# Patient Record
Sex: FEMALE | Race: BLACK OR AFRICAN AMERICAN | Employment: FULL TIME | URBAN - METROPOLITAN AREA
[De-identification: names, ages, dates, MRNs, and addresses within clinical notes are randomized per-mention and may not be internally consistent; named-entity substitution may affect disease eponyms.]

---

## 2017-01-03 ENCOUNTER — HOSPITAL ENCOUNTER (OUTPATIENT)
Dept: WOUND CARE | Age: 57
Discharge: HOME | End: 2017-01-03
Payer: COMMERCIAL

## 2017-01-03 PROCEDURE — 29580 STRAPPING UNNA BOOT: CPT

## 2017-01-11 PROCEDURE — 11042 DBRDMT SUBQ TIS 1ST 20SQCM/<: CPT

## 2017-01-17 PROCEDURE — 29580 STRAPPING UNNA BOOT: CPT

## 2017-01-23 PROCEDURE — 29580 STRAPPING UNNA BOOT: CPT

## 2017-02-01 ENCOUNTER — HOSPITAL ENCOUNTER (OUTPATIENT)
Dept: WOUND CARE | Age: 57
Discharge: HOME | End: 2017-02-01
Payer: COMMERCIAL

## 2017-02-01 PROCEDURE — 11042 DBRDMT SUBQ TIS 1ST 20SQCM/<: CPT

## 2017-02-10 PROCEDURE — 97602 WOUND(S) CARE NON-SELECTIVE: CPT

## 2017-02-17 PROCEDURE — 29580 STRAPPING UNNA BOOT: CPT

## 2017-02-27 PROCEDURE — 97602 WOUND(S) CARE NON-SELECTIVE: CPT

## 2017-03-07 ENCOUNTER — HOSPITAL ENCOUNTER (OUTPATIENT)
Dept: WOUND CARE | Age: 57
Discharge: HOME | End: 2017-03-07
Payer: COMMERCIAL

## 2017-03-07 PROCEDURE — 29580 STRAPPING UNNA BOOT: CPT

## 2017-03-15 PROCEDURE — 11042 DBRDMT SUBQ TIS 1ST 20SQCM/<: CPT

## 2017-03-20 PROCEDURE — 29580 STRAPPING UNNA BOOT: CPT

## 2017-04-03 ENCOUNTER — HOSPITAL ENCOUNTER (OUTPATIENT)
Dept: WOUND CARE | Age: 57
Discharge: HOME | End: 2017-04-03
Payer: COMMERCIAL

## 2017-04-03 PROCEDURE — 29580 STRAPPING UNNA BOOT: CPT

## 2017-04-12 PROCEDURE — 15271 SKIN SUB GRAFT TRNK/ARM/LEG: CPT

## 2017-04-17 PROCEDURE — 29580 STRAPPING UNNA BOOT: CPT

## 2017-04-24 PROCEDURE — 29580 STRAPPING UNNA BOOT: CPT

## 2017-05-01 ENCOUNTER — HOSPITAL ENCOUNTER (OUTPATIENT)
Dept: WOUND CARE | Age: 57
Discharge: HOME | End: 2017-05-01
Payer: COMMERCIAL

## 2017-05-01 PROCEDURE — 29580 STRAPPING UNNA BOOT: CPT

## 2017-05-08 PROCEDURE — 29580 STRAPPING UNNA BOOT: CPT

## 2017-05-17 PROCEDURE — 99211 OFF/OP EST MAY X REQ PHY/QHP: CPT

## 2019-02-19 ENCOUNTER — HOSPITAL ENCOUNTER (OUTPATIENT)
Dept: PREADMISSION TESTING | Age: 59
Discharge: HOME | End: 2019-02-19
Payer: COMMERCIAL

## 2019-02-19 VITALS — BODY MASS INDEX: 51.03 KG/M2 | WEIGHT: 288 LBS | HEIGHT: 63 IN

## 2019-02-19 LAB
ANION GAP SERPL CALC-SCNC: 7 MMOL/L (ref 3–18)
ATRIAL RATE: 68 BPM
BUN SERPL-MCNC: 17 MG/DL (ref 7–18)
BUN/CREAT SERPL: 17 (ref 12–20)
CALCIUM SERPL-MCNC: 9 MG/DL (ref 8.5–10.1)
CALCULATED P AXIS, ECG09: 66 DEGREES
CALCULATED R AXIS, ECG10: 12 DEGREES
CALCULATED T AXIS, ECG11: 64 DEGREES
CHLORIDE SERPL-SCNC: 103 MMOL/L (ref 100–108)
CO2 SERPL-SCNC: 30 MMOL/L (ref 21–32)
CREAT SERPL-MCNC: 1 MG/DL (ref 0.6–1.3)
DIAGNOSIS, 93000: NORMAL
GLUCOSE SERPL-MCNC: 96 MG/DL (ref 74–99)
HCT VFR BLD AUTO: 41.1 % (ref 35–45)
HGB BLD-MCNC: 12.6 G/DL (ref 12–16)
P-R INTERVAL, ECG05: 158 MS
POTASSIUM SERPL-SCNC: 4.9 MMOL/L (ref 3.5–5.5)
Q-T INTERVAL, ECG07: 422 MS
QRS DURATION, ECG06: 94 MS
QTC CALCULATION (BEZET), ECG08: 448 MS
SODIUM SERPL-SCNC: 140 MMOL/L (ref 136–145)
VENTRICULAR RATE, ECG03: 68 BPM

## 2019-02-19 PROCEDURE — 85018 HEMOGLOBIN: CPT

## 2019-02-19 PROCEDURE — 80048 BASIC METABOLIC PNL TOTAL CA: CPT

## 2019-02-19 PROCEDURE — 93005 ELECTROCARDIOGRAM TRACING: CPT

## 2019-02-19 RX ORDER — IBUPROFEN 800 MG/1
800 TABLET ORAL
COMMUNITY
End: 2022-10-18 | Stop reason: ALTCHOICE

## 2019-02-19 RX ORDER — FLUTICASONE PROPIONATE AND SALMETEROL 250; 50 UG/1; UG/1
1 POWDER RESPIRATORY (INHALATION) EVERY 12 HOURS
COMMUNITY

## 2019-02-19 RX ORDER — LORAZEPAM 0.5 MG/1
0.5 TABLET ORAL
COMMUNITY

## 2019-02-19 RX ORDER — SPIRONOLACTONE AND HYDROCHLOROTHIAZIDE 25; 25 MG/1; MG/1
1 TABLET ORAL DAILY
COMMUNITY

## 2019-02-19 RX ORDER — BACLOFEN 20 MG
1 TABLET ORAL DAILY
COMMUNITY

## 2019-02-19 RX ORDER — METOPROLOL SUCCINATE 25 MG/1
25 TABLET, EXTENDED RELEASE ORAL DAILY
COMMUNITY

## 2019-02-19 RX ORDER — IPRATROPIUM BROMIDE AND ALBUTEROL SULFATE 2.5; .5 MG/3ML; MG/3ML
3 SOLUTION RESPIRATORY (INHALATION)
COMMUNITY

## 2019-02-19 RX ORDER — LOSARTAN POTASSIUM 100 MG/1
100 TABLET ORAL DAILY
COMMUNITY

## 2019-02-19 RX ORDER — SODIUM CHLORIDE, SODIUM LACTATE, POTASSIUM CHLORIDE, CALCIUM CHLORIDE 600; 310; 30; 20 MG/100ML; MG/100ML; MG/100ML; MG/100ML
125 INJECTION, SOLUTION INTRAVENOUS CONTINUOUS
Status: CANCELLED | OUTPATIENT
Start: 2019-02-19

## 2019-02-19 RX ORDER — BENZONATATE 100 MG/1
100 CAPSULE ORAL
COMMUNITY
End: 2022-10-18 | Stop reason: ALTCHOICE

## 2019-02-19 NOTE — PERIOP NOTES
Instructed to remove piercing ,PCP aware of scheduled surgery does not meet criteria for special population poornima prep reviewed instructed to hold supplements per anesthesia guidelines instructed to bring inhaler day of surgery

## 2019-03-04 ENCOUNTER — ANESTHESIA (OUTPATIENT)
Dept: SURGERY | Age: 59
End: 2019-03-04
Payer: COMMERCIAL

## 2019-03-04 ENCOUNTER — APPOINTMENT (OUTPATIENT)
Dept: GENERAL RADIOLOGY | Age: 59
End: 2019-03-04
Attending: PODIATRIST
Payer: COMMERCIAL

## 2019-03-04 ENCOUNTER — ANESTHESIA EVENT (OUTPATIENT)
Dept: SURGERY | Age: 59
End: 2019-03-04
Payer: COMMERCIAL

## 2019-03-04 ENCOUNTER — HOSPITAL ENCOUNTER (OUTPATIENT)
Age: 59
Setting detail: OUTPATIENT SURGERY
Discharge: HOME | End: 2019-03-04
Attending: PODIATRIST | Admitting: PODIATRIST
Payer: COMMERCIAL

## 2019-03-04 VITALS
WEIGHT: 287.25 LBS | OXYGEN SATURATION: 100 % | TEMPERATURE: 97.1 F | HEIGHT: 63 IN | HEART RATE: 85 BPM | DIASTOLIC BLOOD PRESSURE: 51 MMHG | RESPIRATION RATE: 20 BRPM | SYSTOLIC BLOOD PRESSURE: 134 MMHG | BODY MASS INDEX: 50.89 KG/M2

## 2019-03-04 DIAGNOSIS — M21.612 BUNION OF LEFT FOOT: Primary | ICD-10-CM

## 2019-03-04 DIAGNOSIS — M79.89 MASS OF SOFT TISSUE OF LEFT LOWER EXTREMITY: ICD-10-CM

## 2019-03-04 DIAGNOSIS — M77.52 BONE SPUR OF LEFT FOOT: ICD-10-CM

## 2019-03-04 PROCEDURE — 74011250636 HC RX REV CODE- 250/636

## 2019-03-04 PROCEDURE — 76010000153 HC OR TIME 1.5 TO 2 HR: Performed by: PODIATRIST

## 2019-03-04 PROCEDURE — 77030032490 HC SLV COMPR SCD KNE COVD -B: Performed by: PODIATRIST

## 2019-03-04 PROCEDURE — 77030020268 HC MISC GENERAL SUPPLY: Performed by: PODIATRIST

## 2019-03-04 PROCEDURE — 77030031139 HC SUT VCRL2 J&J -A: Performed by: PODIATRIST

## 2019-03-04 PROCEDURE — 74011250636 HC RX REV CODE- 250/636: Performed by: PODIATRIST

## 2019-03-04 PROCEDURE — 77030020753 HC CUF TRNQT 1BLA STRY -B: Performed by: PODIATRIST

## 2019-03-04 PROCEDURE — 74011000250 HC RX REV CODE- 250

## 2019-03-04 PROCEDURE — 74011250637 HC RX REV CODE- 250/637: Performed by: PODIATRIST

## 2019-03-04 PROCEDURE — 77030006788 HC BLD SAW OSC STRY -B: Performed by: PODIATRIST

## 2019-03-04 PROCEDURE — 77030002933 HC SUT MCRYL J&J -A: Performed by: PODIATRIST

## 2019-03-04 PROCEDURE — 77030020269 HC MISC IMPL: Performed by: PODIATRIST

## 2019-03-04 PROCEDURE — 74011000250 HC RX REV CODE- 250: Performed by: PODIATRIST

## 2019-03-04 PROCEDURE — 74011250637 HC RX REV CODE- 250/637

## 2019-03-04 PROCEDURE — 74011000272 HC RX REV CODE- 272: Performed by: PODIATRIST

## 2019-03-04 PROCEDURE — 77030020782 HC GWN BAIR PAWS FLX 3M -B: Performed by: PODIATRIST

## 2019-03-04 PROCEDURE — 88305 TISSUE EXAM BY PATHOLOGIST: CPT

## 2019-03-04 PROCEDURE — 76060000034 HC ANESTHESIA 1.5 TO 2 HR: Performed by: PODIATRIST

## 2019-03-04 PROCEDURE — 76210000026 HC REC RM PH II 1 TO 1.5 HR: Performed by: PODIATRIST

## 2019-03-04 RX ORDER — INSULIN LISPRO 100 [IU]/ML
INJECTION, SOLUTION INTRAVENOUS; SUBCUTANEOUS ONCE
Status: DISCONTINUED | OUTPATIENT
Start: 2019-03-04 | End: 2019-03-04 | Stop reason: HOSPADM

## 2019-03-04 RX ORDER — OXYCODONE AND ACETAMINOPHEN 5; 325 MG/1; MG/1
1 TABLET ORAL
Status: DISCONTINUED | OUTPATIENT
Start: 2019-03-04 | End: 2019-03-04 | Stop reason: HOSPADM

## 2019-03-04 RX ORDER — SODIUM CHLORIDE 0.9 % (FLUSH) 0.9 %
5-40 SYRINGE (ML) INJECTION AS NEEDED
Status: DISCONTINUED | OUTPATIENT
Start: 2019-03-04 | End: 2019-03-04 | Stop reason: HOSPADM

## 2019-03-04 RX ORDER — FENTANYL CITRATE 50 UG/ML
INJECTION, SOLUTION INTRAMUSCULAR; INTRAVENOUS AS NEEDED
Status: DISCONTINUED | OUTPATIENT
Start: 2019-03-04 | End: 2019-03-04 | Stop reason: HOSPADM

## 2019-03-04 RX ORDER — OXYCODONE AND ACETAMINOPHEN 5; 325 MG/1; MG/1
1 TABLET ORAL
Qty: 48 TAB | Refills: 0 | Status: SHIPPED | OUTPATIENT
Start: 2019-03-04 | End: 2019-03-07

## 2019-03-04 RX ORDER — LIDOCAINE HYDROCHLORIDE 20 MG/ML
INJECTION, SOLUTION EPIDURAL; INFILTRATION; INTRACAUDAL; PERINEURAL AS NEEDED
Status: DISCONTINUED | OUTPATIENT
Start: 2019-03-04 | End: 2019-03-04 | Stop reason: HOSPADM

## 2019-03-04 RX ORDER — DEXTROSE 50 % IN WATER (D50W) INTRAVENOUS SYRINGE
25-50 AS NEEDED
Status: DISCONTINUED | OUTPATIENT
Start: 2019-03-04 | End: 2019-03-04 | Stop reason: HOSPADM

## 2019-03-04 RX ORDER — KETOROLAC TROMETHAMINE 30 MG/ML
INJECTION, SOLUTION INTRAMUSCULAR; INTRAVENOUS
Status: COMPLETED
Start: 2019-03-04 | End: 2019-03-04

## 2019-03-04 RX ORDER — ESMOLOL HYDROCHLORIDE 10 MG/ML
INJECTION INTRAVENOUS AS NEEDED
Status: DISCONTINUED | OUTPATIENT
Start: 2019-03-04 | End: 2019-03-04 | Stop reason: HOSPADM

## 2019-03-04 RX ORDER — ALBUTEROL SULFATE 90 UG/1
AEROSOL, METERED RESPIRATORY (INHALATION) AS NEEDED
Status: DISCONTINUED | OUTPATIENT
Start: 2019-03-04 | End: 2019-03-04 | Stop reason: HOSPADM

## 2019-03-04 RX ORDER — FENTANYL CITRATE 50 UG/ML
25 INJECTION, SOLUTION INTRAMUSCULAR; INTRAVENOUS
Status: DISCONTINUED | OUTPATIENT
Start: 2019-03-04 | End: 2019-03-04 | Stop reason: HOSPADM

## 2019-03-04 RX ORDER — KETAMINE HYDROCHLORIDE 10 MG/ML
INJECTION, SOLUTION INTRAMUSCULAR; INTRAVENOUS AS NEEDED
Status: DISCONTINUED | OUTPATIENT
Start: 2019-03-04 | End: 2019-03-04 | Stop reason: HOSPADM

## 2019-03-04 RX ORDER — MIDAZOLAM HYDROCHLORIDE 1 MG/ML
INJECTION, SOLUTION INTRAMUSCULAR; INTRAVENOUS AS NEEDED
Status: DISCONTINUED | OUTPATIENT
Start: 2019-03-04 | End: 2019-03-04 | Stop reason: HOSPADM

## 2019-03-04 RX ORDER — PROPOFOL 10 MG/ML
INJECTION, EMULSION INTRAVENOUS
Status: DISCONTINUED | OUTPATIENT
Start: 2019-03-04 | End: 2019-03-04 | Stop reason: HOSPADM

## 2019-03-04 RX ORDER — NALOXONE HYDROCHLORIDE 0.4 MG/ML
0.2 INJECTION, SOLUTION INTRAMUSCULAR; INTRAVENOUS; SUBCUTANEOUS AS NEEDED
Qty: 0.5 ML | Refills: 0 | Status: SHIPPED | OUTPATIENT
Start: 2019-03-04 | End: 2021-04-14 | Stop reason: CLARIF

## 2019-03-04 RX ORDER — SODIUM CHLORIDE, SODIUM LACTATE, POTASSIUM CHLORIDE, CALCIUM CHLORIDE 600; 310; 30; 20 MG/100ML; MG/100ML; MG/100ML; MG/100ML
125 INJECTION, SOLUTION INTRAVENOUS CONTINUOUS
Status: DISCONTINUED | OUTPATIENT
Start: 2019-03-04 | End: 2019-03-04 | Stop reason: HOSPADM

## 2019-03-04 RX ORDER — SODIUM CHLORIDE, SODIUM LACTATE, POTASSIUM CHLORIDE, CALCIUM CHLORIDE 600; 310; 30; 20 MG/100ML; MG/100ML; MG/100ML; MG/100ML
100 INJECTION, SOLUTION INTRAVENOUS CONTINUOUS
Status: DISCONTINUED | OUTPATIENT
Start: 2019-03-04 | End: 2019-03-04 | Stop reason: HOSPADM

## 2019-03-04 RX ORDER — KETOROLAC TROMETHAMINE 30 MG/ML
30 INJECTION, SOLUTION INTRAMUSCULAR; INTRAVENOUS
Status: COMPLETED | OUTPATIENT
Start: 2019-03-04 | End: 2019-03-04

## 2019-03-04 RX ORDER — NALOXONE HYDROCHLORIDE 0.4 MG/ML
0.2 INJECTION, SOLUTION INTRAMUSCULAR; INTRAVENOUS; SUBCUTANEOUS AS NEEDED
Status: DISCONTINUED | OUTPATIENT
Start: 2019-03-04 | End: 2019-03-04 | Stop reason: HOSPADM

## 2019-03-04 RX ORDER — GLYCOPYRROLATE 0.2 MG/ML
INJECTION INTRAMUSCULAR; INTRAVENOUS AS NEEDED
Status: DISCONTINUED | OUTPATIENT
Start: 2019-03-04 | End: 2019-03-04 | Stop reason: HOSPADM

## 2019-03-04 RX ORDER — PROPOFOL 10 MG/ML
INJECTION, EMULSION INTRAVENOUS AS NEEDED
Status: DISCONTINUED | OUTPATIENT
Start: 2019-03-04 | End: 2019-03-04 | Stop reason: HOSPADM

## 2019-03-04 RX ORDER — SODIUM CHLORIDE 0.9 % (FLUSH) 0.9 %
5-40 SYRINGE (ML) INJECTION EVERY 8 HOURS
Status: DISCONTINUED | OUTPATIENT
Start: 2019-03-04 | End: 2019-03-04 | Stop reason: HOSPADM

## 2019-03-04 RX ORDER — MAGNESIUM SULFATE 100 %
4 CRYSTALS MISCELLANEOUS AS NEEDED
Status: DISCONTINUED | OUTPATIENT
Start: 2019-03-04 | End: 2019-03-04 | Stop reason: HOSPADM

## 2019-03-04 RX ADMIN — KETAMINE HYDROCHLORIDE 10 MG: 10 INJECTION, SOLUTION INTRAMUSCULAR; INTRAVENOUS at 14:28

## 2019-03-04 RX ADMIN — PROPOFOL 80 MG: 10 INJECTION, EMULSION INTRAVENOUS at 14:15

## 2019-03-04 RX ADMIN — KETOROLAC TROMETHAMINE 30 MG: 30 INJECTION, SOLUTION INTRAMUSCULAR at 16:12

## 2019-03-04 RX ADMIN — PROPOFOL 50 MG: 10 INJECTION, EMULSION INTRAVENOUS at 15:16

## 2019-03-04 RX ADMIN — PROPOFOL 50 MG: 10 INJECTION, EMULSION INTRAVENOUS at 15:33

## 2019-03-04 RX ADMIN — FENTANYL CITRATE 50 MCG: 50 INJECTION, SOLUTION INTRAMUSCULAR; INTRAVENOUS at 14:09

## 2019-03-04 RX ADMIN — GLYCOPYRROLATE 0.2 MG: 0.2 INJECTION INTRAMUSCULAR; INTRAVENOUS at 14:09

## 2019-03-04 RX ADMIN — PROPOFOL 50 MG: 10 INJECTION, EMULSION INTRAVENOUS at 15:40

## 2019-03-04 RX ADMIN — FENTANYL CITRATE 50 MCG: 50 INJECTION, SOLUTION INTRAMUSCULAR; INTRAVENOUS at 14:21

## 2019-03-04 RX ADMIN — SODIUM CHLORIDE, SODIUM LACTATE, POTASSIUM CHLORIDE, AND CALCIUM CHLORIDE: 600; 310; 30; 20 INJECTION, SOLUTION INTRAVENOUS at 14:41

## 2019-03-04 RX ADMIN — PROPOFOL 30 MG: 10 INJECTION, EMULSION INTRAVENOUS at 14:25

## 2019-03-04 RX ADMIN — MIDAZOLAM HYDROCHLORIDE 4 MG: 1 INJECTION, SOLUTION INTRAMUSCULAR; INTRAVENOUS at 14:09

## 2019-03-04 RX ADMIN — ESMOLOL HYDROCHLORIDE 30 MG: 10 INJECTION INTRAVENOUS at 14:34

## 2019-03-04 RX ADMIN — PROPOFOL 50 MG: 10 INJECTION, EMULSION INTRAVENOUS at 15:28

## 2019-03-04 RX ADMIN — PROPOFOL 50 MG: 10 INJECTION, EMULSION INTRAVENOUS at 15:29

## 2019-03-04 RX ADMIN — SODIUM CHLORIDE, SODIUM LACTATE, POTASSIUM CHLORIDE, AND CALCIUM CHLORIDE 125 ML/HR: 600; 310; 30; 20 INJECTION, SOLUTION INTRAVENOUS at 10:44

## 2019-03-04 RX ADMIN — PROPOFOL 50 MG: 10 INJECTION, EMULSION INTRAVENOUS at 14:53

## 2019-03-04 RX ADMIN — OXYCODONE HYDROCHLORIDE AND ACETAMINOPHEN 1 TABLET: 5; 325 TABLET ORAL at 16:17

## 2019-03-04 RX ADMIN — KETAMINE HYDROCHLORIDE 30 MG: 10 INJECTION, SOLUTION INTRAMUSCULAR; INTRAVENOUS at 14:15

## 2019-03-04 RX ADMIN — PROPOFOL 60 MCG/KG/MIN: 10 INJECTION, EMULSION INTRAVENOUS at 14:28

## 2019-03-04 RX ADMIN — LIDOCAINE HYDROCHLORIDE 60 MG: 20 INJECTION, SOLUTION EPIDURAL; INFILTRATION; INTRACAUDAL; PERINEURAL at 14:15

## 2019-03-04 RX ADMIN — PROPOFOL 50 MG: 10 INJECTION, EMULSION INTRAVENOUS at 15:34

## 2019-03-04 RX ADMIN — PROPOFOL 30 MG: 10 INJECTION, EMULSION INTRAVENOUS at 14:21

## 2019-03-04 RX ADMIN — CEFAZOLIN SODIUM 3 G: 10 INJECTION, POWDER, FOR SOLUTION INTRAVENOUS at 14:18

## 2019-03-04 RX ADMIN — MIDAZOLAM HYDROCHLORIDE 2 MG: 1 INJECTION, SOLUTION INTRAMUSCULAR; INTRAVENOUS at 14:34

## 2019-03-04 RX ADMIN — KETOROLAC TROMETHAMINE 30 MG: 30 INJECTION, SOLUTION INTRAMUSCULAR; INTRAVENOUS at 16:12

## 2019-03-04 RX ADMIN — PROPOFOL 100 MG: 10 INJECTION, EMULSION INTRAVENOUS at 15:23

## 2019-03-04 RX ADMIN — ALBUTEROL SULFATE 2 PUFF: 90 AEROSOL, METERED RESPIRATORY (INHALATION) at 14:09

## 2019-03-04 NOTE — BRIEF OP NOTE
BRIEF OPERATIVE NOTE Date of Procedure: 3/4/2019 Preoperative Diagnosis: OSTEOPHYTE, HALLUX VALGUS DISORDER, soft tissue mass excision, spur 5th met base Postoperative Diagnosis: OSTEOPHYTE, HALLUX VALGUS DISORDER Procedure(s): LEFT FOOT BUNIONECTOMY AND EXOSTECTOMY WITH EXCISION OF SOFT TISSUE LESION 5TH METATARSAL BASE WITH C-ARM Surgeon(s) and Role: * ALICE Melton - Primary Surgical Assistant: Marissa Jansen Surgical Staff: 
Circ-1: Christopher Wilkinson RN 
Circ-Relief: Doc Melendez RN Radiology Technician: Richa Gleason Scrub Tech-1: Brandon Dodson Surg Asst-1: Chrystal Malin Float Staff: Piyush Walker RN Event Time In Time Out Incision Start [phone] Incision Close 9200 Anesthesia: MAC Estimated Blood Loss: 5-10cc's Specimens:  
ID Type Source Tests Collected by Time Destination 1 : SOFT TISSUE LESION LEFT FOOT Preservative Foot, left  Billie Miller [place] 3/4/2019 1525 Pathology Findings: the patient tolerated the procedure and anesthesia well Complications: none Implants:  
Implant Name Type Inv. Item Serial No.  Lot No. LRB No. Used Action AMNIOTIC UMBILICAL CORD   [id number]   Left 1 Implanted HEADLESS CANULATED SHORT SCREW   1  0 Left 1 Implanted HEADLESS CANNULATED SHORT SCREW      0 Left 1 Implanted

## 2019-03-04 NOTE — ANESTHESIA POSTPROCEDURE EVALUATION
Procedure(s): LEFT FOOT BUNIONECTOMY AND EXOSTECTOMY WITH EXCISION OF SOFT TISSUE LESION 5TH METATARSAL BASE WITH C-ARM. Anesthesia Post Evaluation Comments: Post-Anesthesia Evaluation and Assessment Cardiovascular Function/Vital Signs /51   Pulse 85   Temp 36.2 °C (97.1 °F)   Resp 20   Ht 5' 3\" (1.6 m)   Wt 130.3 kg (287 lb 4 oz)   SpO2 100%   BMI 50.88 kg/m² Patient is status post Procedure(s): LEFT FOOT BUNIONECTOMY AND EXOSTECTOMY WITH EXCISION OF SOFT TISSUE LESION 5TH METATARSAL BASE WITH C-ARM. Nausea/Vomiting: Controlled. Postoperative hydration reviewed and adequate. Pain: 
Pain Scale 1: Numeric (0 - 10) (03/04/19 1706) Pain Intensity 1: 3 (03/04/19 1706) Managed. Neurological Status:  
Neuro (WDL): Within Defined Limits (03/04/19 1706) At baseline. Mental Status and Level of Consciousness: Arousable. Pulmonary Status:  
O2 Device: Nasal cannula (03/04/19 0760) Adequate oxygenation and airway patent. Complications related to anesthesia: None Post-anesthesia assessment completed. No concerns. Patient has met all discharge requirements. Signed By: Bety Burroughs MD  
 March 4, 2019 Visit Vitals /51 Pulse 85 Temp 36.2 °C (97.1 °F) Resp 20 Ht 5' 3\" (1.6 m) Wt 130.3 kg (287 lb 4 oz) SpO2 100% BMI 50.88 kg/m²

## 2019-03-04 NOTE — DISCHARGE INSTRUCTIONS
DISCHARGE SUMMARY from Nurse    PATIENT INSTRUCTIONS:    After general anesthesia or intravenous sedation, for 24 hours or while taking prescription Narcotics:  · Limit your activities  · Do not drive and operate hazardous machinery  · Do not make important personal or business decisions  · Do  not drink alcoholic beverages  · If you have not urinated within 8 hours after discharge, please contact your surgeon on call. Report the following to your surgeon:  · Excessive pain, swelling, redness or odor of or around the surgical area  · Temperature over 100.5  · Nausea and vomiting lasting longer than 4 hours or if unable to take medications  · Any signs of decreased circulation or nerve impairment to extremity: change in color, persistent  numbness, tingling, coldness or increase pain  · Any questions    What to do at Home:  Recommended activity: Activity as tolerated and no driving for today. If you experience any of the following symptoms fever, chills, uncontrollable pain, active bleeding, please follow up with Dr. Trudy Verde. *  Please give a list of your current medications to your Primary Care Provider. *  Please update this list whenever your medications are discontinued, doses are      changed, or new medications (including over-the-counter products) are added. *  Please carry medication information at all times in case of emergency situations. These are general instructions for a healthy lifestyle:    No smoking/ No tobacco products/ Avoid exposure to second hand smoke  Surgeon General's Warning:  Quitting smoking now greatly reduces serious risk to your health.     Obesity, smoking, and sedentary lifestyle greatly increases your risk for illness    A healthy diet, regular physical exercise & weight monitoring are important for maintaining a healthy lifestyle    You may be retaining fluid if you have a history of heart failure or if you experience any of the following symptoms:  Weight gain of 3 pounds or more overnight or 5 pounds in a week, increased swelling in our hands or feet or shortness of breath while lying flat in bed. Please call your doctor as soon as you notice any of these symptoms; do not wait until your next office visit. Recognize signs and symptoms of STROKE:    F-face looks uneven    A-arms unable to move or move unevenly    S-speech slurred or non-existent    T-time-call 911 as soon as signs and symptoms begin-DO NOT go       Back to bed or wait to see if you get better-TIME IS BRAIN. Warning Signs of HEART ATTACK     Call 911 if you have these symptoms:   Chest discomfort. Most heart attacks involve discomfort in the center of the chest that lasts more than a few minutes, or that goes away and comes back. It can feel like uncomfortable pressure, squeezing, fullness, or pain.  Discomfort in other areas of the upper body. Symptoms can include pain or discomfort in one or both arms, the back, neck, jaw, or stomach.  Shortness of breath with or without chest discomfort.  Other signs may include breaking out in a cold sweat, nausea, or lightheadedness. Don't wait more than five minutes to call 911 - MINUTES MATTER! Fast action can save your life. Calling 911 is almost always the fastest way to get lifesaving treatment. Emergency Medical Services staff can begin treatment when they arrive -- up to an hour sooner than if someone gets to the hospital by car. Patient armband removed and shredded    The discharge information has been reviewed with the patient and caregiver. The patient and caregiver verbalized understanding. Discharge medications reviewed with the patient and caregiver and appropriate educational materials and side effects teaching were provided.   ___________________________________________________________________________________________________________________________________

## 2019-03-04 NOTE — BRIEF OP NOTE
BRIEF OPERATIVE NOTE Date of Procedure: 3/4/2019 Preoperative Diagnosis: OSTEOPHYTE, HALLUX VALGUS DISORDER Postoperative Diagnosis: OSTEOPHYTE, HALLUX VALGUS DISORDER Procedure(s): LEFT FOOT BUNIONECTOMY AND EXOSTECTOMY WITH EXCISION OF SOFT TISSUE LESION 5TH METATARSAL BASE WITH C-ARM Surgeon(s) and Role: * Cornelius Walters DPM - Primary Surgical Assistant: nurse Surgical Staff: 
Circ-1: Andrew Rutledge RN 
Circ-Relief: Angeline Blake RN Radiology Technician: Geannie Alpers Scrub Tech-1: Fadi Munson Surg Asst-1: Jayy Claros Float Staff: Edmundo Slater RN Event Time In Time Out Incision Start [phone] Incision Close 0855 Anesthesia: MAC Estimated Blood Loss: 5-10cc's Specimens:  
ID Type Source Tests Collected by Time Destination 1 : SOFT TISSUE LESION LEFT FOOT Preservative Foot, left  Morenciindira Walters [facility] 3/4/2019 1525 Pathology Findings: the patient tolerated the procedure and anesthesia well Complications: none Implants:  
Implant Name Type Inv. Item Serial No.  Lot No. LRB No. Used Action AMNIOTIC UMBILICAL CORD   [id number]   Left 1 Implanted HEADLESS CANULATED SHORT SCREW   1  0 Left 1 Implanted HEADLESS CANNULATED SHORT SCREW      0 Left 1 Implanted

## 2019-03-04 NOTE — PERIOP NOTES
Reviewed PTA medication list with patient/caregiver and patient/caregiver denies any additional medications. Patient admits to having a responsible adult care for them for at least 24 hours after surgery. 
  
Dual skin assessment completed by Darnell LOGAN and KIMBERLY Johnson RN.

## 2019-03-04 NOTE — ANESTHESIA PREPROCEDURE EVALUATION
Anesthetic History PONV Comments: IV anti-emetics. Review of Systems / Medical History Patient summary reviewed, nursing notes reviewed and pertinent labs reviewed Pulmonary Sleep apnea: CPAP Asthma : well controlled Neuro/Psych Cardiovascular Hypertension: well controlled GI/Hepatic/Renal 
  
GERD: well controlled Endo/Other Morbid obesity and arthritis Other Findings Physical Exam 
 
Airway Mallampati: II 
TM Distance: 4 - 6 cm Neck ROM: normal range of motion Mouth opening: Normal 
 
 Cardiovascular Rhythm: regular Rate: normal 
 
 
 
 Dental 
 
Dentition: Caps/crowns Pulmonary Breath sounds clear to auscultation Abdominal 
GI exam deferred Other Findings Anesthetic Plan ASA: 3 Anesthesia type: MAC Induction: Intravenous Anesthetic plan and risks discussed with: Patient and Family IV anti-emetics.

## 2019-03-05 NOTE — OP NOTES
[facility]  OPERATIVE REPORT    Name:  Ciara Barone  MR#:   [id number]  :  1960  ACCOUNT #:  [de-identified]  DATE OF SERVICE:  2019      PREOPERATIVE DIAGNOSES:  Bunion deformity of the left foot and left foot fifth metatarsal base spur and soft tissue mass, left foot, planar fifth metatarsal base. POSTOPERATIVE DIAGNOSES:  Bunion deformity of the left foot and left foot fifth metatarsal base spur and soft tissue mass, left foot, planar fifth metatarsal base. PROCEDURE PERFORMED:  1. Piyush bunionectomy with 2 screw fixation, [place] 28 18 mm x2.  2.  Excision of soft tissue mass. 3.  Spur resection. 4.  There was also implantation of amnionic umbilical cord sheet placed into the capsule of the first MPJ. SURGEON:  Maggi Hernandez DPM.    ASSISTANT: nurse. ANESTHESIA:  MAC with local.    COMPLICATIONS: none. SPECIMENS REMOVED:  bone. IMPLANTS:  [place] 28 screws x2. ESTIMATED BLOOD LOSS:  5 to 10 mL. PROCEDURE:  The patient was taken to OR, placed in the supine position on the operating room table and local and IV sedation was achieved. The patient was later intubated because of restless leg syndrome, but she had basically a high level MAC. The left foot was prepped and draped with Webril at the ankle and a tourniquet at that level for 250 mmHg. Utilizing a sterile #15 blade, a first metatarsophalangeal joint incision was made curvilinear with a sterile #15 blade. Once deep and full skin thickness, bleeders were ligated as necessary. Blunt and sharp dissection down to the periosteal capsule layer. A dorsomedial first metatarsal spur head was observed and resected with the powered sagittal saw. Next, the capital fragment osteotomy was performed in [place]rich fashion with a sagittal saw, through and through cut medial and lateral and then impacted onto the neck shaft region.   K-wires from the [place] 28 system were placed from distal dorsal to proximal plantar lateral direction and then another screw was placed from proximal medial to distal plantar lateral direction. Next, the excess bone at the osteotomy junction was resected with a sagittal saw, rasped smooth, first with copious amounts of normal saline. Next utilizing a sterile #15 blade, 2 semi-elliptical incisions were made on the plantar lateral aspect of the fifth met base. The lesion was resected in toto and was sent off for pathology and at this point, rasping of the spur was performed and the wound was flushed with copious amounts of normal saline. A 2-0 nylon was used for the plantar incision and then 3-0 Vicryl for capsule dorsally. The amnionic umbilical cord was placed over the top of the capsule once it was reapproximated with 3-0 Vicryl and then subcutaneous 4-0 Vicryl and skin 4-0 nylon. There were 20 mL of 0.5% Marcaine injected preoperatively and a 10 mL 50:50 mix of lidocaine and Marcaine mix intraoperatively and then at the end 10 mL of Marcaine 0.5% plain with 2 mL of dexamethasone phosphate and 1 mL of Kenalog 10. Xeroform, 4x4's, 4 inch Kerlix and Ace wrap were applied to a semicompressive fashion. Immediate cap refill was observed to all digits. The tourniquet time was 75 minutes. No complications.       MAYNOR [email or web address]  D:  03/04/2019 16:04  T:  03/05/2019 14:11  JOB #:  [id number]  CC:  Gomez Turcios MD

## 2021-04-14 ENCOUNTER — OFFICE VISIT (OUTPATIENT)
Dept: SURGERY | Age: 61
End: 2021-04-14
Payer: COMMERCIAL

## 2021-04-14 VITALS
TEMPERATURE: 98.1 F | SYSTOLIC BLOOD PRESSURE: 129 MMHG | DIASTOLIC BLOOD PRESSURE: 82 MMHG | HEART RATE: 78 BPM | WEIGHT: 283 LBS | OXYGEN SATURATION: 100 % | RESPIRATION RATE: 16 BRPM | HEIGHT: 63 IN | BODY MASS INDEX: 50.14 KG/M2

## 2021-04-14 DIAGNOSIS — M79.89 MASS OF SOFT TISSUE OF LEFT LOWER EXTREMITY: ICD-10-CM

## 2021-04-14 DIAGNOSIS — Z98.84 STATUS POST GASTRIC BYPASS FOR OBESITY: ICD-10-CM

## 2021-04-14 DIAGNOSIS — I10 ESSENTIAL HYPERTENSION: ICD-10-CM

## 2021-04-14 DIAGNOSIS — M19.90 ARTHRITIS: ICD-10-CM

## 2021-04-14 DIAGNOSIS — K21.9 GASTROESOPHAGEAL REFLUX DISEASE, UNSPECIFIED WHETHER ESOPHAGITIS PRESENT: ICD-10-CM

## 2021-04-14 DIAGNOSIS — J45.909 UNCOMPLICATED ASTHMA, UNSPECIFIED ASTHMA SEVERITY, UNSPECIFIED WHETHER PERSISTENT: ICD-10-CM

## 2021-04-14 DIAGNOSIS — K90.9 INTESTINAL MALABSORPTION, UNSPECIFIED TYPE: Primary | ICD-10-CM

## 2021-04-14 DIAGNOSIS — Z87.891 SMOKING HISTORY: ICD-10-CM

## 2021-04-14 DIAGNOSIS — E66.01 MORBID OBESITY WITH BODY MASS INDEX (BMI) OF 50.0 TO 59.9 IN ADULT (HCC): ICD-10-CM

## 2021-04-14 DIAGNOSIS — E78.00 HYPERCHOLESTEROLEMIA: ICD-10-CM

## 2021-04-14 PROCEDURE — 99245 OFF/OP CONSLTJ NEW/EST HI 55: CPT | Performed by: SPECIALIST

## 2021-04-14 RX ORDER — SIMVASTATIN 20 MG/1
TABLET, FILM COATED ORAL
COMMUNITY
End: 2021-07-08

## 2021-04-14 NOTE — PROGRESS NOTES
Revision Surgery Consultation    Subjective: The patient is a 62 y.o. obese female with a Body mass index is 50.13 kg/m². .  The patient had an open gastric bypass procedure done approximatly 18 years ago in [place] by Dr. Stacey Fischer.  her starting weight prior to surgery was 353 lbs. she ultimately lost approximately 144 lbs with a subsequent weight regain of 74 lbs. her last bariatric follow-up was several years ago with Dr. Stacey Fischer. Eduard Paul notes that she had no issues in the immediate post-op phase and had no hospital readmissions in the remote post-op phase. she currently is having the following issues related to his health: concerned with impending health issues with weight regain to include a worsening arthritic back status. she is here today to discuss a possible revision of her gastric bypass because of weight regain. In addition to requesting a revision she is here to discuss possible intervention of an incisional hernia. All of their prior evaluations available by both their PCP's and specialists physicians have been reviewed today either in the Care Everywhere portal or scanned under the media tab. I have spent a large portion of my initial consultation today reviewing the patients current dietary habits which have contributed to their health issues, weight regain and  their current obesity. They understand that generally speaking,  weight regain is  a function of resuming less that ideal dietary habits instead of being a procedural issue. They understand that older procedures are more likely to be associated with a less that perfect procedural result, such as a prior vertical banded gastroplasty or non divided gastric bypass. These procedures are more likely to result in staple line failures with resultant weight regain. This has been explained to the patient via diagrams of these older procedures and given to the patient.     I have suggested to them personally a dietary regimen that they can initiate now to help with their status as it pertains to their weight. They understand that the most important aspect of their journey through their weight loss endeavor will be their adherence to a new lifestyle of healthy eating behavior. They also understand that an adherence to an exercise program will not only help with weight loss but is ultimately important in weight maintenance. Patient Active Problem List    Diagnosis Date Noted    Status post gastric bypass for obesity     Intestinal malabsorption     Morbid obesity with body mass index (BMI) of 50.0 to 59.9 in adult ([facility] [address]     Hypertension     Arthritis     Asthma     GERD (gastroesophageal reflux disease)     Lymph edema     Venous ulcer ([facility] [address]     Hypercholesterolemia     Smoking history     Right knee DJD 2015      Past Surgical History:   Procedure Laterality Date    COLONOSCOPY,DIAGNOSTIC      in conjunction with open gastric bypass    HX ABDOMINOPLASTY      HX BUNIONECTOMY Bilateral     HX  SECTION      X 2    HX GASTRIC BYPASS  2003    HX KNEE REPLACEMENT Bilateral 2015    HX OTHER SURGICAL Bilateral     varicose vein stipping    HX ROTATOR CUFF REPAIR Right 2014    HX TUBAL LIGATION        Social History     Tobacco Use    Smoking status: Former Smoker     Years: 0.50     Quit date:      Years since quittin.3    Smokeless tobacco: Never Used   Substance Use Topics    Alcohol use: Yes     Alcohol/week: 5.0 standard drinks     Types: 5 Glasses of wine per week     Frequency: Monthly or less      Family History   Problem Relation Age of Onset    Dementia Father       Current Outpatient Medications   Medication Sig Dispense Refill    simvastatin (Zocor) 20 mg tablet Take  by mouth nightly.  LORazepam (ATIVAN) 0.5 mg tablet Take 0.5 mg by mouth as needed for Anxiety.       benzonatate (TESSALON) 100 mg capsule Take 100 mg by mouth three (3) times daily as needed for Cough.  metoprolol succinate (TOPROL-XL) 25 mg XL tablet Take 25 mg by mouth daily.  ibuprofen (MOTRIN) 800 mg tablet Take 800 mg by mouth every eight (8) hours as needed for Pain.  fluticasone-salmeterol (ADVAIR DISKUS) 250-50 mcg/dose diskus inhaler Take 1 Puff by inhalation every twelve (12) hours.  magnesium oxide 500 mg tab Take  by mouth daily.  losartan (COZAAR) 100 mg tablet Take 100 mg by mouth daily.  spironolactone-hydrochlorothiazide (ALDACTAZIDE) 25-25 mg per tablet Take 1 Tab by mouth daily.  albuterol-ipratropium (DUO-NEB) 2.5 mg-0.5 mg/3 ml nebu 3 mL by Nebulization route as needed.  omeprazole (PRILOSEC) 20 mg capsule Take 20 mg by mouth daily. Pt instructed to take with a small bit of water on DOS      b complex vitamins (B COMPLEX 1) tablet Take 1 Tab by mouth three (3) times daily.  multivitamin (ONE A DAY) tablet Take 1 Tab by mouth daily.  albuterol (PROVENTIL HFA, VENTOLIN HFA, PROAIR HFA) 90 mcg/actuation inhaler Take 2 Puffs by inhalation every four (4) hours as needed for Wheezing.  amLODIPine (NORVASC) 5 mg tablet Take 5 mg by mouth daily.  Pt instructed to take with a small bit of water on DOS       Allergies   Allergen Reactions    Latex Rash    Adhesive Tape-Silicones Rash    Bactrim [Sulfamethoprim Ds] Itching    Codeine Palpitations    Morphine Itching          Review of Systems:            General - No history or complaints of unexpected fever, chills, or weight loss  Head/Neck - No history or complaints of headache, diplopia, dysphagia, hearing loss  Cardiac - No history or complaints of chest pain, palpitations, murmur, or shortness of breath  Pulmonary - No history or complaints of shortness of breath, productive cough, hemoptysis  Gastrointestinal - No history or complaints of reflux,  abdominal pain, obstipation/constipation, blood per rectum  Genitourinary - No history or complaints of hematuria/dysuria, stress urinary incontinence symptoms, or renal lithiasis  Musculoskeletal - No history or complaints of joint pain or muscular weakness  Hematologic - No history or complaints of bleeding disorders, blood transfusions, sickle cell anemia  Neurologic - No history or complaints of  migraine headaches, seizure activity, syncopal episodes, TIA or stroke  Integumentary - No history or complaints of rashes, abnormal nevi, skin cancer  Gynecological - No history of heavy menses/abnormal menses           Objective:     Visit Vitals  /82 (BP 1 Location: Left arm, BP Patient Position: Sitting, BP Cuff Size: Adult)   Pulse 78   Temp 98.1 °F (36.7 °C)   Resp 16   Ht 5' 3\" (1.6 m)   Wt 128.4 kg (283 lb)   LMP 06/30/2014   SpO2 100%   BMI 50.13 kg/m²       Physical Examination: General appearance - alert, well appearing, and in no distress  Mental status - alert, oriented to person, place, and time  Eyes - pupils equal and reactive, extraocular eye movements intact  Ears - bilateral TM's and external ear canals normal  Nose - normal and patent, no erythema, discharge or polyps  Mouth - mucous membranes moist, pharynx normal without lesions  Neck - supple, no significant adenopathy  Lymphatics - no palpable lymphadenopathy, no hepatosplenomegaly  Chest - clear to auscultation, no wheezes, rales or rhonchi, symmetric air entry  Heart - normal rate, regular rhythm, normal S1, S2, no murmurs, rubs, clicks or gallops  Abdomen - Large hernia which is reducible, soft, nontender, nondistended, no masses or organomegaly  Back exam - full range of motion, no tenderness, palpable spasm or pain on motion  Neurological - alert, oriented, normal speech, no focal findings or movement disorder noted  Musculoskeletal - no joint tenderness, deformity or swelling  Extremities - peripheral pulses normal, no pedal edema, no clubbing or cyanosis  Skin - normal coloration and turgor, no rashes, no suspicious skin lesions noted    Labs / Old Records: Lab Results   Component Value Date/Time    WBC 6.4 04/02/2015 02:36 AM    HGB 12.6 02/19/2019 11:30 AM    HCT 41.1 02/19/2019 11:30 AM    PLATELET 129 77/49/9899 02:36 AM    MCV 84.4 04/02/2015 02:36 AM     Lab Results   Component Value Date/Time    Sodium 140 02/19/2019 11:30 AM    Potassium 4.9 02/19/2019 11:30 AM    Chloride 103 02/19/2019 11:30 AM    CO2 30 02/19/2019 11:30 AM    Anion gap 7 02/19/2019 11:30 AM    Glucose 96 02/19/2019 11:30 AM    BUN 17 02/19/2019 11:30 AM    Creatinine 1.00 02/19/2019 11:30 AM    BUN/Creatinine ratio 17 02/19/2019 11:30 AM    GFR est AA >60 02/19/2019 11:30 AM    GFR est non-AA 57 (L) 02/19/2019 11:30 AM    Calcium 9.0 02/19/2019 11:30 AM    Bilirubin, total 0.3 03/16/2015 08:20 AM    Alk. phosphatase 117 03/16/2015 08:20 AM    Protein, total 7.2 03/16/2015 08:20 AM    Albumin 3.4 03/16/2015 08:20 AM    Globulin 3.8 03/16/2015 08:20 AM    A-G Ratio 0.9 03/16/2015 08:20 AM    ALT (SGPT) 23 03/16/2015 08:20 AM     No results found for: IRON, FE, TIBC, IBCT, PSAT, FERR  No results found for: FOL, RBCF  No results found for: VITD3, XQVID2, XQVID3, XQVID, VD3RIA          Old operative reports reviewed if available and are scanned under the media tab or reviewed under Care Everywhere        Assessment:     Morbid obesity status post open gastric bypass procedure approximately 13 years ago by erica gaines with complaint of weight regain and incisional hernia. She has had labs this year via her PCP to include a normal CMP and a Vit D level (55) - remainder of needed routine bariatric labs ordered today. Plan: 1. Weight regain-Today in our office I had a lengthy discussion with Salome Rincon regarding the nature of their prior procedure. We discussed the anatomical changes to their anatomy and how this relates to  contributing weight regain.  Our office will continue to attempt to obtain any medical records related to their procedure if we were not able to obtain theme today. It was also discussed today that before any decisions can be made regarding a possible revision of their initial  procedure that an upper GI swallow study must be obtained to evaluate their post surgical anatomy. They understand that the majority of bariatric patients are not revision candidates due to appropriate post surgical anatomy that can not be improved upon. They understand also that if their initial procedure was performed via an open technique that this alone complicates their situation immensely. They understand, as I have explained today, that the adhesive disease associated with prior open procedures is at times a rate limiting factor. This precludes our ability to perform a revision procedure safely. The factors that contribute to this are increased risk such as age, health issues and increased risk from a procedural standpoint and have been discussed today. We will proceed with the UGI swallow study as described above. The patient understands all of the above and wishes to proceed with the study. 2.Nutrition-  I have discussed in detail the pitfalls in diet that have contributed to their weight regain and the importance of adhering to a lifelong regimen of dietary goals and proper eating habits. I have discussed the proper lifelong bariatric diet  in detail spending in excess of 20 minutes discussing this. We will schedule them for a dietary consultation with our nutritionist and urge them to continue on a regular follow-up schedule with her. 3.Maintenance vitamins- Today we have discussed the importance of vitamins as it pertains to their procedure and we will obtain appropriate lab to check all levels. They have been provided a handout regarding this today. 4. Incisional hernia - will refer for repair. Total time spent with the patient reviewing their complex history of bariatric surgery,diet, and plan is in excess of 60 minutes.       Secondary Diagnoses:         Signed By: Minh Crenshaw MD     April 14, 2021

## 2021-04-14 NOTE — PATIENT INSTRUCTIONS
Patient Instructions 1. Continue to monitor carbohydrate and protein intake- remember to keep your           total  carbohydrates to 50 grams or less per day for best results. 2. Remember hydration goals - usually 48 to 64 ounces of liquids per day 3. Continue to work towards exercise goals - minimum 3 days per week of 45          minutes to  1 hour at a time. 4. Remember to take vitamins as directed Supplement Resource Guide Importance of Protein:  
Maintains lean body mass, produces antibodies to fight off infections, heals wounds, minimizes hair loss, helps to give you energy, helps with satiety, and keeping you full between meals. Importance of Calcium: 
Needed for healthy bones and teeth, normal blood clotting, and nervous system functioning, higher risk of osteoporosis and bone disease with non-compliance. Importance of Multivitamins: Many functions. Supply you with extra nutrients that you may be missing from food. May lead to iron deficiency anemia, weakness, fatigue, and many other symptoms with non-compliance. Importance of B Vitamins: 
Important for red blood cell formation, metabolism, energy, and helps to maintain a healthy nervous system. Protein Supplement Find one you like now. Use immediately after surgery. Look for: 
35-50g protein each day from your protein supplement once you reach the progression diet. 0-3 g fat per serving 0-3 g sugar per serving Protein drinks should be split in separate dosages. Recommend: Lifelong 1 year + Calcium Supplement:  
 
Start taking within a month after surgery. Look for: Calcium Citrate Plus D (1500 mg per day) Recommend: Citracal 
 
 . Avoid chocolate chewable calcium. Can use chewable bariatric or [employer] brand or similar chewable. The body cannot absorb more than 500-600 mg @ a time.  
 
 
Take for Life Multi-vitamin Supplement:   
 
1st Month After Surgery: Any complete chewable, such as: Rays Complete chewables. Avoid Ray sours or gummies. They lack iron and other important nutrients and also have added sugar. Continue with chewable vitamin or change to adult complete multivitamin one month after surgery. Menstruating women can take a prenatal vitamin. Make sure has at least 18 mg iron and 776-270 mcg folic acid): Vitamin B12, B Complex Vitamin, and Biotin Start taking within a month after surgery. Vitamin B12:  1000 mcg of Vitamin B12 three times weekly Must take sublingually (meaning you take it under your tongue) or in a liquid drop form for easy absorption. B Complex Vitamin: Take a pill or liquid drop form once daily. Biotin: This vitamin can help prevent hair loss. Recommend 5mg  
(5000 mcg) a day Biotin is Optional

## 2021-04-21 LAB
BASOPHILS # BLD: 73 CELLS/UL (ref 0–200)
BASOPHILS NFR BLD: 1.3 %
EOSINOPHIL # BLD: 101 CELLS/UL (ref 15–500)
EOSINOPHIL NFR BLD: 1.8 %
ERYTHROCYTE [DISTWIDTH] IN BLOOD BY AUTOMATED COUNT: 14.1 % (ref 11–15)
FERRITIN SERPL-MCNC: 63 NG/ML (ref 16–232)
FOLATE,FOL: >24 NG/ML
HCT VFR BLD AUTO: 40.3 % (ref 35–45)
HGB BLD-MCNC: 12.9 G/DL (ref 11.7–15.5)
IRON,IRN: 48 MCG/DL (ref 45–160)
LYMPHOCYTES # BLD: 1915 CELLS/UL (ref 850–3900)
LYMPHOCYTES NFR BLD: 34.2 %
MCH RBC QN AUTO: 27.9 PG (ref 27–33)
MCHC RBC AUTO-ENTMCNC: 32 G/DL (ref 32–36)
MCV RBC AUTO: 87 FL (ref 80–100)
MONOCYTES # BLD: 330 CELLS/UL (ref 200–950)
MONOCYTES NFR BLD: 5.9 %
NEUTROPHILS # BLD AUTO: 3181 CELLS/UL (ref 1500–7800)
NEUTROPHILS # BLD: 56.8 %
PLATELET # BLD AUTO: 332 THOUSAND/UL (ref 140–400)
PMV BLD AUTO: 10.1 FL (ref 7.5–12.5)
RBC # BLD AUTO: 4.63 MILLION/UL (ref 3.8–5.1)
VIT B12 SERPL-MCNC: 668 PG/ML (ref 200–1100)
VITAMIN B1, WHOLE BLOOD, 66250: 126 NMOL/L (ref 78–185)
WBC # BLD AUTO: 5.6 THOUSAND/UL (ref 3.8–10.8)

## 2021-04-27 ENCOUNTER — OFFICE VISIT (OUTPATIENT)
Dept: SURGERY | Age: 61
End: 2021-04-27

## 2021-04-27 VITALS — HEIGHT: 63 IN | BODY MASS INDEX: 50.68 KG/M2 | WEIGHT: 286 LBS

## 2021-04-27 DIAGNOSIS — E66.01 MORBID OBESITY WITH BODY MASS INDEX (BMI) OF 50.0 TO 59.9 IN ADULT (HCC): Primary | ICD-10-CM

## 2021-04-27 NOTE — PROGRESS NOTES
Medical Weight Loss Multi-Disciplinary Program    Name: Josie Robert   : 1960    Session# 1  Pt attended in-person class. Weight obtained in office. Date: 2021    Visit Vitals  Ht 5' 3\" (1.6 m)   Wt 129.7 kg (286 lb)   BMI 50.66 kg/m²       Dietary Instructions    Reviewed intake  Understanding low carbohydrates, low sugar, higher protein meals  Instruction given for personal dietary changes  Discussed perceived compliance  Comments: RD Reviewed Diet History and Physical Activity/Exercise habits. Recommended dietary changes discussed for both before and after surgery. Reviewed recommendation to follow [id number] calorie diet, working to reduce total carbohydrate intake to  g or less per day and increasing protein intake to  g per day, compared current intake to recommendations. Recommend pt adopt an exercise routine of at least 3-5 days a week for at least 30 minutes/day. If pt unable to participate in walking, jonatan, swimming, or other exercises, recommend pt work with a physical therapist and/or participate in chair exercises, yoga, and/or increase activities of daily living as able. Patient participated in pre-recorded education class video. Recorded video of dietitian discussing key diet principles following weight loss surgery, importance of high protein diet, sources of protein, tips for following low carbohydrate diet, and ways to measure carbohydrates utilizing carbohydrate counting. Discussed importance of sampling protein supplements, protein supplement label guidelines and popularly selected items. Also reviewed the key vitamins and minerals to supplement long term after surgery. But these vitamins will be reviewed again in a pre-operative class. Patient watched the video in its entirety and turned in the 3 embedded passcodes from the video session.       Behavior Modification    Identify obstacles to trigger change  Achieving/Rewarding goals met  Positive attitude  Comments: Reinforced importance continuing to modify lifestyle patterns and behaviors to promote weight loss and long term weight maintenance. I talked to patient about the importance of taking vitamins post op and we reviewed the vitamins that patients will be taking post op. Patient will hear this again at pre op class before surgery. Patient had the opportunity to ask questions about these vitamins that will be lifelong. Comments:  Pt set personal behavior goals as related to pre- and post-surgical recommendations and diet key principles. Recommend pt track progress and set SMART goals around post-op diet key principles. Pt completed Bariatric-specific nutrition questionnaire. RD reviewed answers and provided feedback on responses that align with bariatric recommendations to behavior changes. Provided feedback on recommendations, areas for improvement, and provided positive feedback on areas where pt is making positive behavior changes. Pt questionnaire is included in chart separate from this note. Goals:   1. Work to increase to 3-4 small meals per day, with planned snacks as needed. Recommend following plate method for meal planning - focusing on lean protein, non-starchy vegetables, and measured amounts of starch. - Goal of  g protein and  g carbohydrate per day. - Recommend continuing protein supplement as meal replacement at least 1x/day OR as high protein snack option  2. Increase non caloric fluid to 64 oz per day. Eliminate caffeine, added sugar, carbonation, and straws.               -Continue to work to decrease sugar sweetened beverages - goal of calorie free beverages only              -Must eliminate caffeine prior to surgery and avoid for ~6-8 weeks   -Practice 30:30 rule,  food and flood   3. Start activity regimen, work to increase ADL  4. Start Complete MVI    Candidate for surgery (per RD):  PENDING

## 2021-05-12 ENCOUNTER — APPOINTMENT (OUTPATIENT)
Dept: GENERAL RADIOLOGY | Age: 61
End: 2021-05-12
Attending: SPECIALIST
Payer: COMMERCIAL

## 2021-05-12 ENCOUNTER — HOSPITAL ENCOUNTER (OUTPATIENT)
Age: 61
Setting detail: OUTPATIENT SURGERY
Discharge: HOME | End: 2021-05-12
Attending: SPECIALIST | Admitting: SPECIALIST
Payer: COMMERCIAL

## 2021-05-12 ENCOUNTER — APPOINTMENT (OUTPATIENT)
Dept: SURGERY | Age: 61
End: 2021-05-12

## 2021-05-12 VITALS
WEIGHT: 286.5 LBS | HEIGHT: 63 IN | BODY MASS INDEX: 50.76 KG/M2 | DIASTOLIC BLOOD PRESSURE: 98 MMHG | SYSTOLIC BLOOD PRESSURE: 176 MMHG | HEART RATE: 70 BPM | OXYGEN SATURATION: 100 % | TEMPERATURE: 97.8 F | RESPIRATION RATE: 18 BRPM

## 2021-05-12 DIAGNOSIS — E66.01 MORBID OBESITY (HCC): ICD-10-CM

## 2021-05-12 DIAGNOSIS — K21.9 GASTROESOPHAGEAL REFLUX DISEASE, UNSPECIFIED WHETHER ESOPHAGITIS PRESENT: ICD-10-CM

## 2021-05-12 PROCEDURE — 76040000019: Performed by: SPECIALIST

## 2021-05-12 PROCEDURE — 74240 X-RAY XM UPR GI TRC 1CNTRST: CPT | Performed by: SPECIALIST

## 2021-05-12 PROCEDURE — 74240 X-RAY XM UPR GI TRC 1CNTRST: CPT

## 2021-05-12 PROCEDURE — 74011000250 HC RX REV CODE- 250: Performed by: SPECIALIST

## 2021-05-12 NOTE — PROCEDURES
[facility]    Procedure Report      Dorotha Lesches  [id number]  1960  Date of Service -     Pre-Op Diagnosis - patient is status post open gastric bypass performed by erica gaines 17 years ago with complaint of weight regain. They now present for UGI to assess their prior anatomy. Post-Op Diagnosis -same    Procedure - UGI study with barium    Surgeon - Saray Li MD    Assistant - None    Complications - None    Specimens - None    Implants - None    Estimate Blood Loss - None    Statement of Medical Necessity - need for UGI evaluation prior to possible revision    Procedure - upon ingestion of barium she was found to have normal anatomy for the surgery she had. She is not a candidate for any type of revision as she has normal post bypass anatomy that can not be improved upon. She is also not a candidate for band over bypass due to her Fobi pouch technique. She does have an incisional hernia related to her prior incision, we will refer her to a specialist to address this.

## 2021-07-08 ENCOUNTER — TRANSCRIBE ORDER (OUTPATIENT)
Dept: REGISTRATION | Age: 61
End: 2021-07-08

## 2021-07-08 ENCOUNTER — HOSPITAL ENCOUNTER (OUTPATIENT)
Dept: PREADMISSION TESTING | Age: 61
Discharge: HOME | End: 2021-07-08
Payer: COMMERCIAL

## 2021-07-08 DIAGNOSIS — S92.341A: ICD-10-CM

## 2021-07-08 DIAGNOSIS — S92.341A: Primary | ICD-10-CM

## 2021-07-08 LAB
ATRIAL RATE: 69 BPM
CALCULATED P AXIS, ECG09: 82 DEGREES
CALCULATED R AXIS, ECG10: 55 DEGREES
CALCULATED T AXIS, ECG11: 74 DEGREES
DIAGNOSIS, 93000: NORMAL
HCT VFR BLD AUTO: 40.9 % (ref 35–45)
HGB BLD-MCNC: 12.6 G/DL (ref 12–16)
P-R INTERVAL, ECG05: 156 MS
POTASSIUM SERPL-SCNC: 4.6 MMOL/L (ref 3.5–5.5)
Q-T INTERVAL, ECG07: 408 MS
QRS DURATION, ECG06: 92 MS
QTC CALCULATION (BEZET), ECG08: 437 MS
VENTRICULAR RATE, ECG03: 69 BPM

## 2021-07-08 PROCEDURE — 84132 ASSAY OF SERUM POTASSIUM: CPT

## 2021-07-08 PROCEDURE — 93005 ELECTROCARDIOGRAM TRACING: CPT

## 2021-07-08 PROCEDURE — 36415 COLL VENOUS BLD VENIPUNCTURE: CPT

## 2021-07-08 PROCEDURE — 85018 HEMOGLOBIN: CPT

## 2021-07-13 ENCOUNTER — HOSPITAL ENCOUNTER (OUTPATIENT)
Dept: PREADMISSION TESTING | Age: 61
Discharge: HOME | End: 2021-07-13
Payer: COMMERCIAL

## 2021-07-13 LAB
ANION GAP SERPL CALC-SCNC: 3 MMOL/L (ref 3–18)
BUN SERPL-MCNC: 20 MG/DL (ref 7–18)
BUN/CREAT SERPL: 19 (ref 12–20)
CALCIUM SERPL-MCNC: 9 MG/DL (ref 8.5–10.1)
CHLORIDE SERPL-SCNC: 102 MMOL/L (ref 100–111)
CO2 SERPL-SCNC: 33 MMOL/L (ref 21–32)
CREAT SERPL-MCNC: 1.07 MG/DL (ref 0.6–1.3)
GLUCOSE SERPL-MCNC: 84 MG/DL (ref 74–99)
POTASSIUM SERPL-SCNC: 5.1 MMOL/L (ref 3.5–5.5)
SODIUM SERPL-SCNC: 138 MMOL/L (ref 136–145)

## 2021-07-13 PROCEDURE — 80048 BASIC METABOLIC PNL TOTAL CA: CPT

## 2021-07-13 PROCEDURE — 36415 COLL VENOUS BLD VENIPUNCTURE: CPT

## 2021-07-30 ENCOUNTER — HOSPITAL ENCOUNTER (OUTPATIENT)
Age: 61
Setting detail: OUTPATIENT SURGERY
Discharge: HOME | End: 2021-07-30
Attending: PODIATRIST | Admitting: PODIATRIST
Payer: COMMERCIAL

## 2021-07-30 ENCOUNTER — ANESTHESIA (OUTPATIENT)
Dept: SURGERY | Age: 61
End: 2021-07-30
Payer: COMMERCIAL

## 2021-07-30 ENCOUNTER — APPOINTMENT (OUTPATIENT)
Dept: GENERAL RADIOLOGY | Age: 61
End: 2021-07-30
Attending: PODIATRIST
Payer: COMMERCIAL

## 2021-07-30 ENCOUNTER — ANESTHESIA EVENT (OUTPATIENT)
Dept: SURGERY | Age: 61
End: 2021-07-30
Payer: COMMERCIAL

## 2021-07-30 VITALS
TEMPERATURE: 97.4 F | HEART RATE: 70 BPM | DIASTOLIC BLOOD PRESSURE: 85 MMHG | RESPIRATION RATE: 18 BRPM | BODY MASS INDEX: 50.73 KG/M2 | WEIGHT: 286.3 LBS | OXYGEN SATURATION: 95 % | SYSTOLIC BLOOD PRESSURE: 162 MMHG | HEIGHT: 63 IN

## 2021-07-30 DIAGNOSIS — M79.671 RIGHT FOOT PAIN: Primary | ICD-10-CM

## 2021-07-30 DIAGNOSIS — M84.474A METATARSAL FRACTURE, PATHOLOGIC, RIGHT, INITIAL ENCOUNTER: ICD-10-CM

## 2021-07-30 DIAGNOSIS — R26.2 DIFFICULTY WALKING: ICD-10-CM

## 2021-07-30 PROCEDURE — 74011000250 HC RX REV CODE- 250: Performed by: REGISTERED NURSE

## 2021-07-30 PROCEDURE — C1713 ANCHOR/SCREW BN/BN,TIS/BN: HCPCS | Performed by: PODIATRIST

## 2021-07-30 PROCEDURE — 74011000272 HC RX REV CODE- 272: Performed by: PODIATRIST

## 2021-07-30 PROCEDURE — 2709999900 HC NON-CHARGEABLE SUPPLY: Performed by: PODIATRIST

## 2021-07-30 PROCEDURE — 77030020782 HC GWN BAIR PAWS FLX 3M -B: Performed by: PODIATRIST

## 2021-07-30 PROCEDURE — 74011000250 HC RX REV CODE- 250: Performed by: ANESTHESIOLOGY

## 2021-07-30 PROCEDURE — 74011250636 HC RX REV CODE- 250/636: Performed by: REGISTERED NURSE

## 2021-07-30 PROCEDURE — 77030008462 HC STPLR SKN PROX J&J -A: Performed by: PODIATRIST

## 2021-07-30 PROCEDURE — 76010000149 HC OR TIME 1 TO 1.5 HR: Performed by: PODIATRIST

## 2021-07-30 PROCEDURE — 76210000021 HC REC RM PH II 0.5 TO 1 HR: Performed by: PODIATRIST

## 2021-07-30 PROCEDURE — 77030031139 HC SUT VCRL2 J&J -A: Performed by: PODIATRIST

## 2021-07-30 PROCEDURE — 74011250636 HC RX REV CODE- 250/636: Performed by: ANESTHESIOLOGY

## 2021-07-30 PROCEDURE — 77030006788 HC BLD SAW OSC STRY -B: Performed by: PODIATRIST

## 2021-07-30 PROCEDURE — 76210000063 HC OR PH I REC FIRST 0.5 HR: Performed by: PODIATRIST

## 2021-07-30 PROCEDURE — 74011250636 HC RX REV CODE- 250/636: Performed by: PODIATRIST

## 2021-07-30 PROCEDURE — 76060000033 HC ANESTHESIA 1 TO 1.5 HR: Performed by: PODIATRIST

## 2021-07-30 PROCEDURE — C1762 CONN TISS, HUMAN(INC FASCIA): HCPCS | Performed by: PODIATRIST

## 2021-07-30 PROCEDURE — 74011000250 HC RX REV CODE- 250: Performed by: PODIATRIST

## 2021-07-30 PROCEDURE — 77030020268 HC MISC GENERAL SUPPLY: Performed by: PODIATRIST

## 2021-07-30 PROCEDURE — 77030013079 HC BLNKT BAIR HGGR 3M -A: Performed by: REGISTERED NURSE

## 2021-07-30 PROCEDURE — 77030020269 HC MISC IMPL: Performed by: PODIATRIST

## 2021-07-30 PROCEDURE — 77030003028 HC SUT VCRL J&J -A: Performed by: PODIATRIST

## 2021-07-30 PROCEDURE — 77030000032 HC CUF TRNQT ZIMM -B: Performed by: PODIATRIST

## 2021-07-30 PROCEDURE — 77030002933 HC SUT MCRYL J&J -A: Performed by: PODIATRIST

## 2021-07-30 PROCEDURE — 77030040361 HC SLV COMPR DVT MDII -B: Performed by: PODIATRIST

## 2021-07-30 RX ORDER — GLYCOPYRROLATE 0.2 MG/ML
INJECTION INTRAMUSCULAR; INTRAVENOUS AS NEEDED
Status: DISCONTINUED | OUTPATIENT
Start: 2021-07-30 | End: 2021-07-30 | Stop reason: HOSPADM

## 2021-07-30 RX ORDER — SODIUM CHLORIDE, SODIUM LACTATE, POTASSIUM CHLORIDE, CALCIUM CHLORIDE 600; 310; 30; 20 MG/100ML; MG/100ML; MG/100ML; MG/100ML
150 INJECTION, SOLUTION INTRAVENOUS CONTINUOUS
Status: DISCONTINUED | OUTPATIENT
Start: 2021-07-30 | End: 2021-07-30 | Stop reason: HOSPADM

## 2021-07-30 RX ORDER — ALBUTEROL SULFATE 0.83 MG/ML
2.5 SOLUTION RESPIRATORY (INHALATION) AS NEEDED
Status: DISCONTINUED | OUTPATIENT
Start: 2021-07-30 | End: 2021-07-30 | Stop reason: HOSPADM

## 2021-07-30 RX ORDER — ONDANSETRON 2 MG/ML
INJECTION INTRAMUSCULAR; INTRAVENOUS AS NEEDED
Status: DISCONTINUED | OUTPATIENT
Start: 2021-07-30 | End: 2021-07-30 | Stop reason: HOSPADM

## 2021-07-30 RX ORDER — KETAMINE HYDROCHLORIDE 50 MG/ML
INJECTION, SOLUTION INTRAMUSCULAR; INTRAVENOUS AS NEEDED
Status: DISCONTINUED | OUTPATIENT
Start: 2021-07-30 | End: 2021-07-30 | Stop reason: HOSPADM

## 2021-07-30 RX ORDER — PROPOFOL 10 MG/ML
INJECTION, EMULSION INTRAVENOUS AS NEEDED
Status: DISCONTINUED | OUTPATIENT
Start: 2021-07-30 | End: 2021-07-30 | Stop reason: HOSPADM

## 2021-07-30 RX ORDER — BUPIVACAINE HYDROCHLORIDE 5 MG/ML
INJECTION, SOLUTION EPIDURAL; INTRACAUDAL AS NEEDED
Status: DISCONTINUED | OUTPATIENT
Start: 2021-07-30 | End: 2021-07-30 | Stop reason: HOSPADM

## 2021-07-30 RX ORDER — ONDANSETRON 2 MG/ML
4 INJECTION INTRAMUSCULAR; INTRAVENOUS ONCE
Status: DISCONTINUED | OUTPATIENT
Start: 2021-07-30 | End: 2021-07-30 | Stop reason: HOSPADM

## 2021-07-30 RX ORDER — DEXTROSE 50 % IN WATER (D50W) INTRAVENOUS SYRINGE
25-50 AS NEEDED
Status: DISCONTINUED | OUTPATIENT
Start: 2021-07-30 | End: 2021-07-30 | Stop reason: HOSPADM

## 2021-07-30 RX ORDER — INSULIN LISPRO 100 [IU]/ML
INJECTION, SOLUTION INTRAVENOUS; SUBCUTANEOUS ONCE
Status: DISCONTINUED | OUTPATIENT
Start: 2021-07-30 | End: 2021-07-30 | Stop reason: HOSPADM

## 2021-07-30 RX ORDER — MAGNESIUM SULFATE 100 %
4 CRYSTALS MISCELLANEOUS AS NEEDED
Status: DISCONTINUED | OUTPATIENT
Start: 2021-07-30 | End: 2021-07-30 | Stop reason: HOSPADM

## 2021-07-30 RX ORDER — SODIUM CHLORIDE 0.9 % (FLUSH) 0.9 %
5-40 SYRINGE (ML) INJECTION EVERY 8 HOURS
Status: DISCONTINUED | OUTPATIENT
Start: 2021-07-30 | End: 2021-07-30 | Stop reason: HOSPADM

## 2021-07-30 RX ORDER — PROPOFOL 10 MG/ML
INJECTION, EMULSION INTRAVENOUS
Status: DISCONTINUED | OUTPATIENT
Start: 2021-07-30 | End: 2021-07-30 | Stop reason: HOSPADM

## 2021-07-30 RX ORDER — MIDAZOLAM HYDROCHLORIDE 1 MG/ML
INJECTION, SOLUTION INTRAMUSCULAR; INTRAVENOUS AS NEEDED
Status: DISCONTINUED | OUTPATIENT
Start: 2021-07-30 | End: 2021-07-30 | Stop reason: HOSPADM

## 2021-07-30 RX ORDER — LIDOCAINE HYDROCHLORIDE 10 MG/ML
INJECTION INFILTRATION; PERINEURAL AS NEEDED
Status: DISCONTINUED | OUTPATIENT
Start: 2021-07-30 | End: 2021-07-30 | Stop reason: HOSPADM

## 2021-07-30 RX ORDER — METOCLOPRAMIDE HYDROCHLORIDE 5 MG/ML
INJECTION INTRAMUSCULAR; INTRAVENOUS AS NEEDED
Status: DISCONTINUED | OUTPATIENT
Start: 2021-07-30 | End: 2021-07-30 | Stop reason: HOSPADM

## 2021-07-30 RX ORDER — OXYCODONE AND ACETAMINOPHEN 5; 325 MG/1; MG/1
1 TABLET ORAL
Qty: 28 TABLET | Refills: 0 | Status: SHIPPED | OUTPATIENT
Start: 2021-07-30 | End: 2021-08-06

## 2021-07-30 RX ORDER — FENTANYL CITRATE 50 UG/ML
25 INJECTION, SOLUTION INTRAMUSCULAR; INTRAVENOUS
Status: DISCONTINUED | OUTPATIENT
Start: 2021-07-30 | End: 2021-07-30 | Stop reason: HOSPADM

## 2021-07-30 RX ORDER — ESMOLOL HYDROCHLORIDE 10 MG/ML
INJECTION INTRAVENOUS AS NEEDED
Status: DISCONTINUED | OUTPATIENT
Start: 2021-07-30 | End: 2021-07-30 | Stop reason: HOSPADM

## 2021-07-30 RX ORDER — SODIUM CHLORIDE, SODIUM LACTATE, POTASSIUM CHLORIDE, CALCIUM CHLORIDE 600; 310; 30; 20 MG/100ML; MG/100ML; MG/100ML; MG/100ML
125 INJECTION, SOLUTION INTRAVENOUS CONTINUOUS
Status: DISCONTINUED | OUTPATIENT
Start: 2021-07-30 | End: 2021-07-30 | Stop reason: HOSPADM

## 2021-07-30 RX ORDER — SODIUM CHLORIDE 0.9 % (FLUSH) 0.9 %
5-40 SYRINGE (ML) INJECTION AS NEEDED
Status: DISCONTINUED | OUTPATIENT
Start: 2021-07-30 | End: 2021-07-30 | Stop reason: HOSPADM

## 2021-07-30 RX ORDER — DIPHENHYDRAMINE HYDROCHLORIDE 50 MG/ML
12.5 INJECTION, SOLUTION INTRAMUSCULAR; INTRAVENOUS
Status: DISCONTINUED | OUTPATIENT
Start: 2021-07-30 | End: 2021-07-30 | Stop reason: HOSPADM

## 2021-07-30 RX ORDER — GENTAMICIN SULFATE 80 MG/50ML
80 INJECTION, SOLUTION INTRAVENOUS ONCE
Status: COMPLETED | OUTPATIENT
Start: 2021-07-30 | End: 2021-07-30

## 2021-07-30 RX ORDER — NALOXONE HYDROCHLORIDE 0.4 MG/ML
0.1 INJECTION, SOLUTION INTRAMUSCULAR; INTRAVENOUS; SUBCUTANEOUS AS NEEDED
Status: DISCONTINUED | OUTPATIENT
Start: 2021-07-30 | End: 2021-07-30 | Stop reason: HOSPADM

## 2021-07-30 RX ORDER — FENTANYL CITRATE 50 UG/ML
INJECTION, SOLUTION INTRAMUSCULAR; INTRAVENOUS AS NEEDED
Status: DISCONTINUED | OUTPATIENT
Start: 2021-07-30 | End: 2021-07-30 | Stop reason: HOSPADM

## 2021-07-30 RX ORDER — LIDOCAINE HYDROCHLORIDE 20 MG/ML
INJECTION, SOLUTION EPIDURAL; INFILTRATION; INTRACAUDAL; PERINEURAL AS NEEDED
Status: DISCONTINUED | OUTPATIENT
Start: 2021-07-30 | End: 2021-07-30 | Stop reason: HOSPADM

## 2021-07-30 RX ADMIN — PROPOFOL 30 MG: 10 INJECTION, EMULSION INTRAVENOUS at 13:34

## 2021-07-30 RX ADMIN — LIDOCAINE HYDROCHLORIDE 80 MG: 20 INJECTION, SOLUTION INTRAVENOUS at 12:25

## 2021-07-30 RX ADMIN — KETAMINE HYDROCHLORIDE 10 MG: 50 INJECTION, SOLUTION, CONCENTRATE INTRAMUSCULAR; INTRAVENOUS at 12:44

## 2021-07-30 RX ADMIN — PROPOFOL 40 MG: 10 INJECTION, EMULSION INTRAVENOUS at 13:43

## 2021-07-30 RX ADMIN — KETAMINE HYDROCHLORIDE 10 MG: 50 INJECTION, SOLUTION, CONCENTRATE INTRAMUSCULAR; INTRAVENOUS at 12:26

## 2021-07-30 RX ADMIN — ONDANSETRON HYDROCHLORIDE 4 MG: 2 INJECTION INTRAMUSCULAR; INTRAVENOUS at 12:44

## 2021-07-30 RX ADMIN — METOCLOPRAMIDE 5 MG: 5 INJECTION, SOLUTION INTRAMUSCULAR; INTRAVENOUS at 12:33

## 2021-07-30 RX ADMIN — PROPOFOL 50 MG: 10 INJECTION, EMULSION INTRAVENOUS at 12:51

## 2021-07-30 RX ADMIN — FENTANYL CITRATE 50 MCG: 50 INJECTION, SOLUTION INTRAMUSCULAR; INTRAVENOUS at 12:58

## 2021-07-30 RX ADMIN — PROPOFOL 50 MG: 10 INJECTION, EMULSION INTRAVENOUS at 12:29

## 2021-07-30 RX ADMIN — PROPOFOL 50 MG: 10 INJECTION, EMULSION INTRAVENOUS at 12:26

## 2021-07-30 RX ADMIN — SODIUM CHLORIDE, SODIUM LACTATE, POTASSIUM CHLORIDE, AND CALCIUM CHLORIDE: 600; 310; 30; 20 INJECTION, SOLUTION INTRAVENOUS at 13:35

## 2021-07-30 RX ADMIN — SODIUM CHLORIDE, SODIUM LACTATE, POTASSIUM CHLORIDE, AND CALCIUM CHLORIDE: 600; 310; 30; 20 INJECTION, SOLUTION INTRAVENOUS at 12:21

## 2021-07-30 RX ADMIN — KETAMINE HYDROCHLORIDE 10 MG: 50 INJECTION, SOLUTION, CONCENTRATE INTRAMUSCULAR; INTRAVENOUS at 12:38

## 2021-07-30 RX ADMIN — FENTANYL CITRATE 50 MCG: 50 INJECTION, SOLUTION INTRAMUSCULAR; INTRAVENOUS at 13:16

## 2021-07-30 RX ADMIN — KETAMINE HYDROCHLORIDE 10 MG: 50 INJECTION, SOLUTION, CONCENTRATE INTRAMUSCULAR; INTRAVENOUS at 12:33

## 2021-07-30 RX ADMIN — KETAMINE HYDROCHLORIDE 10 MG: 50 INJECTION, SOLUTION, CONCENTRATE INTRAMUSCULAR; INTRAVENOUS at 12:42

## 2021-07-30 RX ADMIN — PROPOFOL 50 MG: 10 INJECTION, EMULSION INTRAVENOUS at 13:26

## 2021-07-30 RX ADMIN — MIDAZOLAM HYDROCHLORIDE 2 MG: 1 INJECTION, SOLUTION INTRAMUSCULAR; INTRAVENOUS at 12:22

## 2021-07-30 RX ADMIN — PROPOFOL 50 MCG/KG/MIN: 10 INJECTION, EMULSION INTRAVENOUS at 12:45

## 2021-07-30 RX ADMIN — MIDAZOLAM HYDROCHLORIDE 3 MG: 1 INJECTION, SOLUTION INTRAMUSCULAR; INTRAVENOUS at 12:59

## 2021-07-30 RX ADMIN — PROPOFOL 50 MG: 10 INJECTION, EMULSION INTRAVENOUS at 13:38

## 2021-07-30 RX ADMIN — GLYCOPYRROLATE 0.2 MG: 0.2 INJECTION INTRAMUSCULAR; INTRAVENOUS at 12:39

## 2021-07-30 RX ADMIN — GENTAMICIN SULFATE 80 MG: 80 INJECTION, SOLUTION INTRAVENOUS at 12:31

## 2021-07-30 RX ADMIN — PROPOFOL 50 MG: 10 INJECTION, EMULSION INTRAVENOUS at 12:31

## 2021-07-30 RX ADMIN — ESMOLOL HYDROCHLORIDE 10 MCG: 10 INJECTION, SOLUTION INTRAVENOUS at 13:28

## 2021-07-30 NOTE — ANESTHESIA PREPROCEDURE EVALUATION
Relevant Problems   RESPIRATORY SYSTEM   (+) Asthma      CARDIOVASCULAR   (+) Hypertension      GASTROINTESTINAL   (+) GERD (gastroesophageal reflux disease)      ENDOCRINE   (+) Arthritis       Anesthetic History   No history of anesthetic complications            Review of Systems / Medical History  Patient summary reviewed, nursing notes reviewed and pertinent labs reviewed    Pulmonary        Sleep apnea  Shortness of breath  Asthma        Neuro/Psych   Within defined limits           Cardiovascular    Hypertension              Exercise tolerance: >4 METS     GI/Hepatic/Renal     GERD           Endo/Other        Morbid obesity and arthritis     Other Findings            Physical Exam    Airway  Mallampati: II  TM Distance: 4 - 6 cm  Neck ROM: normal range of motion   Mouth opening: Normal     Cardiovascular  Regular rate and rhythm,  S1 and S2 normal,  no murmur, click, rub, or gallop             Dental  No notable dental hx       Pulmonary  Breath sounds clear to auscultation               Abdominal  GI exam deferred       Other Findings            Anesthetic Plan    ASA: 3  Anesthesia type: MAC            Anesthetic plan and risks discussed with: Patient

## 2021-07-30 NOTE — ANESTHESIA POSTPROCEDURE EVALUATION
Procedure(s):  OPEN REDUCTION INTERNAL FIXATION OF RIGHT FOOT 4TH METATARSAL FRACTURE WITH C-ARM (MAC + LOCAL ANESTHESIA) [facility].     MAC    Anesthesia Post Evaluation      Multimodal analgesia: multimodal analgesia not used between 6 hours prior to anesthesia start to PACU discharge  Patient location during evaluation: PACU  Patient participation: complete - patient participated  Level of consciousness: awake  Pain management: adequate  Airway patency: patent  Anesthetic complications: no  Cardiovascular status: acceptable  Respiratory status: acceptable  Hydration status: acceptable  Post anesthesia nausea and vomiting:  none  Final Post Anesthesia Temperature Assessment:  Normothermia (36.0-37.5 degrees C)      INITIAL Post-op Vital signs:   Vitals Value Taken Time   /81 07/30/21 1415   Temp 36.3 °C (97.3 °F) 07/30/21 1415   Pulse 71 07/30/21 1415   Resp 19 07/30/21 1415   SpO2 95 % 07/30/21 1415

## 2021-07-30 NOTE — PROGRESS NOTES
[address], BILL [place]    INTERVAL NOTE:  Patient had an H&P clearance in the last 20 days. No changes to her procedure as discussed with the patient(Open reduction and internal fixation of right foot 4th metatarsal).

## 2021-07-30 NOTE — BRIEF OP NOTE
[address], BILL [facility]    Op Note:    Surgeon:Christian Garcia  Assistant:nurse  Pre-op Dx: right foot 4th metatarsal displaced healed fracture/neuralgia. Post op Dx: same  Procedure: ORIF of 4th metatarsal right foot  Pathology:none  Anesthesia:Mac with local:25cc . 25% marcaine, 5cc's of 1% lidocaine plain  Hemostasis:mid-calf tourniquet @250mg for 55minutes. EBL:3-5cc's   Materials: 3.0 and 4.0 vicryl, 5.0 monocryl  Injectable:1ml amnio fluid paragon 28, 1cc dexamethasone phosphate. The patient tolerated the procedure and anesthesia well without complication. CFT wnl to all digits.

## 2021-07-30 NOTE — DISCHARGE INSTRUCTIONS
Complete non weight bearing on right foot. Use crutches, ice 3x per day for 30 minutes. Elevate often. DISCHARGE SUMMARY from Nurse    PATIENT INSTRUCTIONS:    After general anesthesia or intravenous sedation, for 24 hours or while taking prescription Narcotics:  · Limit your activities  · Do not drive and operate hazardous machinery  · Do not make important personal or business decisions  · Do  not drink alcoholic beverages  · If you have not urinated within 8 hours after discharge, please contact your surgeon on call. Report the following to your surgeon:  · Excessive pain, swelling, redness or odor of or around the surgical area  · Temperature over 100.5  · Nausea and vomiting lasting longer than 4 hours or if unable to take medications  · Any signs of decreased circulation or nerve impairment to extremity: change in color, persistent  numbness, tingling, coldness or increase pain  · Any questions    What to do at Home:  Recommended activity: Activity as tolerated      *  Please give a list of your current medications to your Primary Care Provider. *  Please update this list whenever your medications are discontinued, doses are      changed, or new medications (including over-the-counter products) are added. *  Please carry medication information at all times in case of emergency situations. These are general instructions for a healthy lifestyle:    No smoking/ No tobacco products/ Avoid exposure to second hand smoke  Surgeon General's Warning:  Quitting smoking now greatly reduces serious risk to your health.     Obesity, smoking, and sedentary lifestyle greatly increases your risk for illness    A healthy diet, regular physical exercise & weight monitoring are important for maintaining a healthy lifestyle    You may be retaining fluid if you have a history of heart failure or if you experience any of the following symptoms:  Weight gain of 3 pounds or more overnight or 5 pounds in a week, increased swelling in our hands or feet or shortness of breath while lying flat in bed. Please call your doctor as soon as you notice any of these symptoms; do not wait until your next office visit. The discharge information has been reviewed with the patient and caregiver. The patient and caregiver verbalized understanding. Discharge medications reviewed with the patient and caregiver and appropriate educational materials and side effects teaching were provided. Learning About Coronavirus [phone])  What is coronavirus (COVID-19)? COVID-19 is a disease caused by a new type of coronavirus. This illness was first found in December 2019. It has since spread worldwide. Coronaviruses are a large group of viruses. They cause the common cold. They also cause more serious illnesses like [place] respiratory syndrome (MERS) and severe acute respiratory syndrome (SARS). COVID-19 is caused by a novel coronavirus. That means it's a new type that has not been seen in people before. What are the symptoms? Coronavirus (COVID-19) symptoms may include:  · Fever. · Cough. · Trouble breathing. · Chills or repeated shaking with chills. · Muscle pain. · Headache. · Sore throat. · New loss of taste or smell. · Vomiting. · Diarrhea. In severe cases, COVID-19 can cause pneumonia and make it hard to breathe without help from a machine. It can cause death. How is it diagnosed? COVID-19 is diagnosed with a viral test. This may also be called a PCR test or antigen test. It looks for evidence of the virus in your breathing passages or lungs (respiratory system). The test is most often done on a sample from the nose, throat, or lungs. It's sometimes done on a sample of saliva. One way a sample is collected is by putting a long swab into the back of your nose. How is it treated? Mild cases of COVID-19 can be treated at home.  Serious cases need treatment in the hospital. Treatment may include medicines to reduce symptoms, plus breathing support such as oxygen therapy or a ventilator. Some people may be placed on their belly to help their oxygen levels. Treatments that may help people who have COVID-19 include:  Antiviral medicines. These medicines treat viral infections. Remdesivir is an example. Immune-based therapy. These medicines help the immune system fight COVID-19. One example is bamlanivimab. It's a monoclonal antibody. Blood thinners. These medicines help prevent blood clots. People with severe illness are at risk for blood clots. How can you protect yourself and others? The best way to protect yourself from getting sick is to:  · Avoid areas where there is an outbreak. · Avoid contact with people who may be infected. · Avoid crowds and try to stay at least 6 feet away from other people. · Wash your hands often, especially after you cough or sneeze. Use soap and water, and scrub for at least 20 seconds. If soap and water aren't available, use an alcohol-based hand . · Avoid touching your mouth, nose, and eyes. To help avoid spreading the virus to others:  · Stay home if you are sick or have been exposed to the virus. Don't go to school, work, or public areas. And don't use public transportation, ride-shares, or taxis unless you have no choice. · Wear a cloth face cover if you have to go to public areas. · Cover your mouth with a tissue when you cough or sneeze. Then throw the tissue in the trash and wash your hands right away. · If you're sick:  ? Leave your home only if you need to get medical care. But call the doctor's office first so they know you're coming. And wear a face cover. ? Wear the face cover whenever you're around other people. It can help stop the spread of the virus when you cough or sneeze. ? Limit contact with pets and people in your home. If possible, stay in a separate bedroom and use a separate bathroom. ? Clean and disinfect your home every day.  Use household  and disinfectant wipes or sprays. Take special care to clean things that you grab with your hands. These include doorknobs, remote controls, phones, and handles on your refrigerator and microwave. And don't forget countertops, tabletops, bathrooms, and computer keyboards. When should you call for help? Call 911 anytime you think you may need emergency care. For example, call if you have life-threatening symptoms, such as:    · You have severe trouble breathing. (You can't talk at all.)     · You have constant chest pain or pressure.     · You are severely dizzy or lightheaded.     · You are confused or can't think clearly.     · Your face and lips have a blue color.     · You pass out (lose consciousness) or are very hard to wake up. Call your doctor now or seek immediate medical care if:    · You have moderate trouble breathing. (You can't speak a full sentence.)     · You are coughing up blood (more than about 1 teaspoon).     · You have signs of low blood pressure. These include feeling lightheaded; being too weak to stand; and having cold, pale, clammy skin. Watch closely for changes in your health, and be sure to contact your doctor if:    · Your symptoms get worse.     · You are not getting better as expected. Call before you go to the doctor's office. Follow their instructions. And wear a cloth face cover. Current as of: December 18, 2020               Content Version: 12.8  © 3812-7963 Healthwise, [facility]. Care instructions adapted under license by [employer] (which disclaims liability or warranty for this information). If you have questions about a medical condition or this instruction, always ask your healthcare professional. [place] [address] any warranty or liability for your use of this information.   ___________________________________________________________________________________________________________________________________

## 2021-07-30 NOTE — OP NOTES
[facility] [place]  OPERATIVE REPORT    Name:  Jessica Eastman  MR#:   [id number]  :  1960  ACCOUNT #:  [de-identified]  DATE OF SERVICE:  2021    PREOPERATIVE DIAGNOSIS:  Right foot fourth metatarsal displaced fracture. POSTOPERATIVE DIAGNOSIS:  Right foot fourth metatarsal displaced fracture. PROCEDURE PERFORMED:  Open reduction internal fixation with a straight four-screw plate [place] 28, 2.5 non-cannulated. Oblique osteotomy. SURGEON:  Jeanette Taylor DPM    ASSISTANT:  none    ANESTHESIA:  none    COMPLICATIONS:  None. SPECIMENS REMOVED:  none    IMPLANTS:  none    ESTIMATED BLOOD LOSS:  3 mL to 5 ml. PROCEDURE:  The patient was taken to the OR and placed in the supine position on the operative table, where local and IV sedation was achieved. The local anesthesia consisted of a total of 25 mL of 0.25% Marcaine plain and 5 mL of 1% lidocaine plain, along with 1 mL of dexamethasone phosphate and 1 mL of amnio-liquid graft of [place] 28. The right lower extremity was prepped and draped in usual sterile manner. The tourniquet was placed along with padding on the right mid calf and insufflated to 250 mmHg for a total of 55 minutes. Utilizing a sterile #15 blade, a linear incision was made along the fourth metatarsal on the right foot. Once through full skin thickness, bleeders were ligated as necessary with blunt and sharp dissection down to the periosteal layer, retracting the extensor tendon. Upon further dissection, the linear capsule was reflected medially and laterally to expose the displaced healed fourth metatarsal fracture pressing on the intermetatarsal nerve. An oblique osteotomy was made on the fourth metatarsal.  Next, the osteotomy was fixated with a 0.035 K-wire temporarily, just for fixation while using a C-arm to adjust the plate for screw fitting. The screws were drilled first, depth gauge and then measuring an 18, 14, 12, and a 10.   Compression had been achieved, rigid compression and improved anatomical position. Verified again with the C-arm, the wound was flushed with copious amounts of normal saline with Betadine solution and then 3-0 Vicryl, 4-0 Vicryl were used for deep and superficial closure and the 5-0 Monocryl for skin. Mastisol and Steri-Strips, multiple 4 x 4 fluffs, Kerlix, Ace wrap. All the capillary refill was within normal limits. The patient was taken to the recovery room. Neurovascular status intact and stable. Complications none. Estimated blood loss 3 mL to 5 mL.       Maurisio Hays DPM      EM/[email or web address]_I/  D:  07/30/2021 13:42  T:  07/30/2021 19:29  JOB #:  [id number]  CC:  Shweta Short MD

## 2021-07-30 NOTE — PERIOP NOTES
Paged Dr. Bala Mays for patient sign out. Patient meets criteria for transfer to the next phase of care.

## 2021-07-30 NOTE — PERIOP NOTES
Discharge instructions given to caregiver. Caregiver verbalizes understanding. Opportunity for questions provided. No questions at this time.

## 2022-03-18 PROBLEM — M79.671 RIGHT FOOT PAIN: Status: ACTIVE | Noted: 2021-07-30

## 2022-03-19 PROBLEM — R26.2 DIFFICULTY WALKING: Status: ACTIVE | Noted: 2021-07-30

## 2022-08-11 ENCOUNTER — TRANSCRIBE ORDER (OUTPATIENT)
Dept: REGISTRATION | Age: 62
End: 2022-08-11

## 2022-08-11 ENCOUNTER — HOSPITAL ENCOUNTER (OUTPATIENT)
Dept: PREADMISSION TESTING | Age: 62
Discharge: HOME | End: 2022-08-11
Payer: COMMERCIAL

## 2022-08-11 DIAGNOSIS — K43.0 INCISIONAL HERNIA WITH OBSTRUCTION: Primary | ICD-10-CM

## 2022-08-11 DIAGNOSIS — K43.0 INCISIONAL HERNIA WITH OBSTRUCTION: ICD-10-CM

## 2022-08-11 LAB
ANION GAP SERPL CALC-SCNC: 3 MMOL/L (ref 3–18)
ATRIAL RATE: 90 BPM
BUN SERPL-MCNC: 25 MG/DL (ref 7–18)
BUN/CREAT SERPL: 20 (ref 12–20)
CALCIUM SERPL-MCNC: 9.7 MG/DL (ref 8.5–10.1)
CALCULATED P AXIS, ECG09: 68 DEGREES
CALCULATED R AXIS, ECG10: 24 DEGREES
CALCULATED T AXIS, ECG11: 78 DEGREES
CHLORIDE SERPL-SCNC: 106 MMOL/L (ref 100–111)
CO2 SERPL-SCNC: 30 MMOL/L (ref 21–32)
CREAT SERPL-MCNC: 1.27 MG/DL (ref 0.6–1.3)
DIAGNOSIS, 93000: NORMAL
ERYTHROCYTE [DISTWIDTH] IN BLOOD BY AUTOMATED COUNT: 13.9 % (ref 11.6–14.5)
GLUCOSE SERPL-MCNC: 97 MG/DL (ref 74–99)
HCT VFR BLD AUTO: 41.5 % (ref 35–45)
HGB BLD-MCNC: 12.9 G/DL (ref 12–16)
MCH RBC QN AUTO: 27.9 PG (ref 24–34)
MCHC RBC AUTO-ENTMCNC: 31.1 G/DL (ref 31–37)
MCV RBC AUTO: 89.8 FL (ref 78–100)
NRBC # BLD: 0 K/UL (ref 0–0.01)
NRBC BLD-RTO: 0 PER 100 WBC
P-R INTERVAL, ECG05: 150 MS
PLATELET # BLD AUTO: 304 K/UL (ref 135–420)
PMV BLD AUTO: 9.7 FL (ref 9.2–11.8)
POTASSIUM SERPL-SCNC: 4.2 MMOL/L (ref 3.5–5.5)
Q-T INTERVAL, ECG07: 362 MS
QRS DURATION, ECG06: 92 MS
QTC CALCULATION (BEZET), ECG08: 442 MS
RBC # BLD AUTO: 4.62 M/UL (ref 4.2–5.3)
SODIUM SERPL-SCNC: 139 MMOL/L (ref 136–145)
VENTRICULAR RATE, ECG03: 90 BPM
WBC # BLD AUTO: 7 K/UL (ref 4.6–13.2)

## 2022-08-11 PROCEDURE — 80048 BASIC METABOLIC PNL TOTAL CA: CPT

## 2022-08-11 PROCEDURE — 85027 COMPLETE CBC AUTOMATED: CPT

## 2022-08-11 PROCEDURE — 36415 COLL VENOUS BLD VENIPUNCTURE: CPT

## 2022-08-11 PROCEDURE — 93005 ELECTROCARDIOGRAM TRACING: CPT

## 2022-08-26 ENCOUNTER — HOSPITAL ENCOUNTER (OUTPATIENT)
Dept: PREADMISSION TESTING | Age: 62
Discharge: HOME | End: 2022-08-26

## 2022-08-26 VITALS — HEIGHT: 63 IN | WEIGHT: 290 LBS | BODY MASS INDEX: 51.38 KG/M2

## 2022-08-26 RX ORDER — MAGNESIUM CITRATE
296 SOLUTION, ORAL ORAL
Status: CANCELLED | OUTPATIENT
Start: 2022-08-26 | End: 2022-08-26

## 2022-08-26 RX ORDER — SODIUM CHLORIDE, SODIUM LACTATE, POTASSIUM CHLORIDE, CALCIUM CHLORIDE 600; 310; 30; 20 MG/100ML; MG/100ML; MG/100ML; MG/100ML
125 INJECTION, SOLUTION INTRAVENOUS CONTINUOUS
Status: CANCELLED | OUTPATIENT
Start: 2022-08-26

## 2022-08-26 RX ORDER — ACETAMINOPHEN 500 MG
500 TABLET ORAL
COMMUNITY

## 2022-08-26 NOTE — PERIOP NOTES
PAT - SURGICAL PRE-ADMISSION INSTRUCTIONS    NAME:  Mihir Castaneda                                                          TODAY'S DATE:  8/26/2022    SURGERY DATE:  9/7/2022                                  SURGERY ARRIVAL TIME:   TBA    Do NOT eat or drink anything, including candy or gum, after MIDNIGHT on 9/7/2022 , unless you have specific instructions from your Surgeon or Anesthesia Provider to do so. No smoking 24 hours before surgery. No alcohol 24 hours prior to the day of surgery. No recreational drugs for one week prior to the day of surgery. Leave all valuables, including money/purse, at home. Remove all jewelry, nail polish, makeup (including mascara); no lotions, powders, deodorant, or perfume/cologne/after shave. Glasses/Contact lenses and Dentures may be worn to the hospital.  They will be removed prior to surgery. Call your doctor if symptoms of a cold or illness develop within 24 ours prior to surgery. AN ADULT MUST DRIVE YOU HOME AFTER OUTPATIENT SURGERY. If you are having an OUTPATIENT procedure, please make arrangements for a responsible adult to be with you for 24 hours after your surgery. If you are admitted to the hospital, you will be assigned to a bed after surgery is complete. Normally a family member will not be able to see you until you are in your assigned bed. 12. Visitation Restrictions Explained. Special Instructions:  Covid Test not needed. Patient vaccinated, Quarantine requirements discussed  No Advanced Directive or DNR  Bring inhaler. , Take these medications the morning of surgery with a sip of water:  metoprolol succinate , omeprazole, rosuvastatin, tylenol if needed    Patient Prep:    use CHG solution three nights prior to procedure     These surgical instructions were reviewed with patient during the PAT  phone call

## 2022-09-07 ENCOUNTER — HOSPITAL ENCOUNTER (OUTPATIENT)
Age: 62
Setting detail: OBSERVATION
Discharge: HOME | End: 2022-09-09
Attending: SURGERY | Admitting: SURGERY
Payer: COMMERCIAL

## 2022-09-07 ENCOUNTER — ANESTHESIA (OUTPATIENT)
Dept: SURGERY | Age: 62
End: 2022-09-07
Payer: COMMERCIAL

## 2022-09-07 ENCOUNTER — ANESTHESIA EVENT (OUTPATIENT)
Dept: SURGERY | Age: 62
End: 2022-09-07
Payer: COMMERCIAL

## 2022-09-07 DIAGNOSIS — K43.6 VENTRAL HERNIA WITH OBSTRUCTION AND WITHOUT GANGRENE: Primary | ICD-10-CM

## 2022-09-07 PROBLEM — K43.2 INCISIONAL HERNIA: Status: ACTIVE | Noted: 2022-09-07

## 2022-09-07 PROCEDURE — 74011250636 HC RX REV CODE- 250/636: Performed by: ANESTHESIOLOGY

## 2022-09-07 PROCEDURE — 77030002966 HC SUT PDS J&J -A: Performed by: SURGERY

## 2022-09-07 PROCEDURE — 77030040361 HC SLV COMPR DVT MDII -B: Performed by: SURGERY

## 2022-09-07 PROCEDURE — 76060000033 HC ANESTHESIA 1 TO 1.5 HR: Performed by: SURGERY

## 2022-09-07 PROCEDURE — C1781 MESH (IMPLANTABLE): HCPCS | Performed by: SURGERY

## 2022-09-07 PROCEDURE — 74011250636 HC RX REV CODE- 250/636: Performed by: SURGERY

## 2022-09-07 PROCEDURE — 77010033678 HC OXYGEN DAILY

## 2022-09-07 PROCEDURE — 77030031139 HC SUT VCRL2 J&J -A: Performed by: SURGERY

## 2022-09-07 PROCEDURE — 76010000149 HC OR TIME 1 TO 1.5 HR: Performed by: SURGERY

## 2022-09-07 PROCEDURE — G0378 HOSPITAL OBSERVATION PER HR: HCPCS

## 2022-09-07 PROCEDURE — 77030002986 HC SUT PROL J&J -A: Performed by: SURGERY

## 2022-09-07 PROCEDURE — C9290 INJ, BUPIVACAINE LIPOSOME: HCPCS | Performed by: SURGERY

## 2022-09-07 PROCEDURE — 77030040506 HC DRN WND MDII -A: Performed by: SURGERY

## 2022-09-07 PROCEDURE — 77030002933 HC SUT MCRYL J&J -A: Performed by: SURGERY

## 2022-09-07 PROCEDURE — 74011250637 HC RX REV CODE- 250/637: Performed by: SURGERY

## 2022-09-07 PROCEDURE — 74011250637 HC RX REV CODE- 250/637: Performed by: ANESTHESIOLOGY

## 2022-09-07 PROCEDURE — 77030002916 HC SUT ETHLN J&J -A: Performed by: SURGERY

## 2022-09-07 PROCEDURE — 74011250636 HC RX REV CODE- 250/636: Performed by: NURSE ANESTHETIST, CERTIFIED REGISTERED

## 2022-09-07 PROCEDURE — 76210000006 HC OR PH I REC 0.5 TO 1 HR: Performed by: SURGERY

## 2022-09-07 PROCEDURE — 2709999900 HC NON-CHARGEABLE SUPPLY: Performed by: SURGERY

## 2022-09-07 PROCEDURE — 77030020782 HC GWN BAIR PAWS FLX 3M -B: Performed by: SURGERY

## 2022-09-07 PROCEDURE — 74011000250 HC RX REV CODE- 250: Performed by: SURGERY

## 2022-09-07 PROCEDURE — 74011000250 HC RX REV CODE- 250: Performed by: ANESTHESIOLOGY

## 2022-09-07 PROCEDURE — 74011000258 HC RX REV CODE- 258: Performed by: SURGERY

## 2022-09-07 PROCEDURE — 77030010507 HC ADH SKN DERMBND J&J -B: Performed by: SURGERY

## 2022-09-07 PROCEDURE — 74011000250 HC RX REV CODE- 250: Performed by: NURSE ANESTHETIST, CERTIFIED REGISTERED

## 2022-09-07 PROCEDURE — 77030040504 HC DRN WND MDII -B: Performed by: SURGERY

## 2022-09-07 DEVICE — COMPOSITE VENTRAL PATCH, POLYESTER & POLYGLYCOLIC-LACTIC ACID PATCH WITH ABSORBABLE COLLAGEN FILM
Type: IMPLANTABLE DEVICE | Site: ABDOMEN | Status: FUNCTIONAL
Brand: PARIETEX

## 2022-09-07 RX ORDER — PROCHLORPERAZINE EDISYLATE 5 MG/ML
5 INJECTION INTRAMUSCULAR; INTRAVENOUS ONCE
Status: DISCONTINUED | OUTPATIENT
Start: 2022-09-07 | End: 2022-09-07 | Stop reason: HOSPADM

## 2022-09-07 RX ORDER — DEXAMETHASONE SODIUM PHOSPHATE 4 MG/ML
INJECTION, SOLUTION INTRA-ARTICULAR; INTRALESIONAL; INTRAMUSCULAR; INTRAVENOUS; SOFT TISSUE AS NEEDED
Status: DISCONTINUED | OUTPATIENT
Start: 2022-09-07 | End: 2022-09-07 | Stop reason: HOSPADM

## 2022-09-07 RX ORDER — MIDAZOLAM HYDROCHLORIDE 1 MG/ML
INJECTION, SOLUTION INTRAMUSCULAR; INTRAVENOUS AS NEEDED
Status: DISCONTINUED | OUTPATIENT
Start: 2022-09-07 | End: 2022-09-07 | Stop reason: HOSPADM

## 2022-09-07 RX ORDER — SODIUM CHLORIDE 0.9 % (FLUSH) 0.9 %
5-40 SYRINGE (ML) INJECTION AS NEEDED
Status: DISCONTINUED | OUTPATIENT
Start: 2022-09-07 | End: 2022-09-09 | Stop reason: HOSPADM

## 2022-09-07 RX ORDER — PANTOPRAZOLE SODIUM 20 MG/1
20 TABLET, DELAYED RELEASE ORAL
Status: DISCONTINUED | OUTPATIENT
Start: 2022-09-08 | End: 2022-09-07

## 2022-09-07 RX ORDER — DIPHENHYDRAMINE HYDROCHLORIDE 50 MG/ML
12.5 INJECTION, SOLUTION INTRAMUSCULAR; INTRAVENOUS
Status: DISCONTINUED | OUTPATIENT
Start: 2022-09-07 | End: 2022-09-07 | Stop reason: HOSPADM

## 2022-09-07 RX ORDER — ONDANSETRON 2 MG/ML
4 INJECTION INTRAMUSCULAR; INTRAVENOUS
Status: DISCONTINUED | OUTPATIENT
Start: 2022-09-07 | End: 2022-09-09 | Stop reason: HOSPADM

## 2022-09-07 RX ORDER — PROPOFOL 10 MG/ML
INJECTION, EMULSION INTRAVENOUS AS NEEDED
Status: DISCONTINUED | OUTPATIENT
Start: 2022-09-07 | End: 2022-09-07 | Stop reason: HOSPADM

## 2022-09-07 RX ORDER — ONDANSETRON 2 MG/ML
INJECTION INTRAMUSCULAR; INTRAVENOUS AS NEEDED
Status: DISCONTINUED | OUTPATIENT
Start: 2022-09-07 | End: 2022-09-07 | Stop reason: HOSPADM

## 2022-09-07 RX ORDER — ACETAMINOPHEN 325 MG/1
650 TABLET ORAL
Status: DISCONTINUED | OUTPATIENT
Start: 2022-09-07 | End: 2022-09-09 | Stop reason: HOSPADM

## 2022-09-07 RX ORDER — GLYCOPYRROLATE 0.2 MG/ML
INJECTION INTRAMUSCULAR; INTRAVENOUS AS NEEDED
Status: DISCONTINUED | OUTPATIENT
Start: 2022-09-07 | End: 2022-09-07 | Stop reason: HOSPADM

## 2022-09-07 RX ORDER — DEXTROSE, SODIUM CHLORIDE, AND POTASSIUM CHLORIDE 5; .45; .15 G/100ML; G/100ML; G/100ML
100 INJECTION INTRAVENOUS CONTINUOUS
Status: DISCONTINUED | OUTPATIENT
Start: 2022-09-07 | End: 2022-09-09 | Stop reason: HOSPADM

## 2022-09-07 RX ORDER — METOCLOPRAMIDE HYDROCHLORIDE 5 MG/ML
INJECTION INTRAMUSCULAR; INTRAVENOUS AS NEEDED
Status: DISCONTINUED | OUTPATIENT
Start: 2022-09-07 | End: 2022-09-07 | Stop reason: HOSPADM

## 2022-09-07 RX ORDER — FENTANYL CITRATE 50 UG/ML
25 INJECTION, SOLUTION INTRAMUSCULAR; INTRAVENOUS AS NEEDED
Status: DISCONTINUED | OUTPATIENT
Start: 2022-09-07 | End: 2022-09-07 | Stop reason: HOSPADM

## 2022-09-07 RX ORDER — HYDROMORPHONE HYDROCHLORIDE 2 MG/ML
INJECTION, SOLUTION INTRAMUSCULAR; INTRAVENOUS; SUBCUTANEOUS AS NEEDED
Status: DISCONTINUED | OUTPATIENT
Start: 2022-09-07 | End: 2022-09-07 | Stop reason: HOSPADM

## 2022-09-07 RX ORDER — NALOXONE HYDROCHLORIDE 0.4 MG/ML
0.1 INJECTION, SOLUTION INTRAMUSCULAR; INTRAVENOUS; SUBCUTANEOUS AS NEEDED
Status: DISCONTINUED | OUTPATIENT
Start: 2022-09-07 | End: 2022-09-07 | Stop reason: HOSPADM

## 2022-09-07 RX ORDER — SODIUM CHLORIDE, SODIUM LACTATE, POTASSIUM CHLORIDE, CALCIUM CHLORIDE 600; 310; 30; 20 MG/100ML; MG/100ML; MG/100ML; MG/100ML
100 INJECTION, SOLUTION INTRAVENOUS CONTINUOUS
Status: DISCONTINUED | OUTPATIENT
Start: 2022-09-07 | End: 2022-09-07 | Stop reason: HOSPADM

## 2022-09-07 RX ORDER — IPRATROPIUM BROMIDE AND ALBUTEROL SULFATE 2.5; .5 MG/3ML; MG/3ML
3 SOLUTION RESPIRATORY (INHALATION)
Status: DISCONTINUED | OUTPATIENT
Start: 2022-09-07 | End: 2022-09-09 | Stop reason: HOSPADM

## 2022-09-07 RX ORDER — OXYCODONE AND ACETAMINOPHEN 5; 325 MG/1; MG/1
1 TABLET ORAL
Status: DISCONTINUED | OUTPATIENT
Start: 2022-09-07 | End: 2022-09-09 | Stop reason: HOSPADM

## 2022-09-07 RX ORDER — LORAZEPAM 0.5 MG/1
0.5 TABLET ORAL
Status: DISCONTINUED | OUTPATIENT
Start: 2022-09-07 | End: 2022-09-09 | Stop reason: HOSPADM

## 2022-09-07 RX ORDER — PHENYLEPHRINE HCL IN 0.9% NACL 1 MG/10 ML
SYRINGE (ML) INTRAVENOUS AS NEEDED
Status: DISCONTINUED | OUTPATIENT
Start: 2022-09-07 | End: 2022-09-07 | Stop reason: HOSPADM

## 2022-09-07 RX ORDER — LIDOCAINE HYDROCHLORIDE 20 MG/ML
INJECTION, SOLUTION EPIDURAL; INFILTRATION; INTRACAUDAL; PERINEURAL AS NEEDED
Status: DISCONTINUED | OUTPATIENT
Start: 2022-09-07 | End: 2022-09-07 | Stop reason: HOSPADM

## 2022-09-07 RX ORDER — FENTANYL CITRATE 50 UG/ML
INJECTION, SOLUTION INTRAMUSCULAR; INTRAVENOUS AS NEEDED
Status: DISCONTINUED | OUTPATIENT
Start: 2022-09-07 | End: 2022-09-07 | Stop reason: HOSPADM

## 2022-09-07 RX ORDER — SODIUM CHLORIDE 0.9 % (FLUSH) 0.9 %
5-40 SYRINGE (ML) INJECTION AS NEEDED
Status: DISCONTINUED | OUTPATIENT
Start: 2022-09-07 | End: 2022-09-07 | Stop reason: HOSPADM

## 2022-09-07 RX ORDER — PANTOPRAZOLE SODIUM 40 MG/1
40 TABLET, DELAYED RELEASE ORAL
Status: DISCONTINUED | OUTPATIENT
Start: 2022-09-08 | End: 2022-09-09 | Stop reason: HOSPADM

## 2022-09-07 RX ORDER — SODIUM CHLORIDE 0.9 % (FLUSH) 0.9 %
5-40 SYRINGE (ML) INJECTION EVERY 8 HOURS
Status: DISCONTINUED | OUTPATIENT
Start: 2022-09-07 | End: 2022-09-09 | Stop reason: HOSPADM

## 2022-09-07 RX ORDER — KETOROLAC TROMETHAMINE 30 MG/ML
30 INJECTION, SOLUTION INTRAMUSCULAR; INTRAVENOUS
Status: DISCONTINUED | OUTPATIENT
Start: 2022-09-07 | End: 2022-09-09 | Stop reason: HOSPADM

## 2022-09-07 RX ORDER — SCOLOPAMINE TRANSDERMAL SYSTEM 1 MG/1
1 PATCH, EXTENDED RELEASE TRANSDERMAL ONCE
Status: DISCONTINUED | OUTPATIENT
Start: 2022-09-07 | End: 2022-09-09 | Stop reason: HOSPADM

## 2022-09-07 RX ORDER — ALBUTEROL SULFATE 0.83 MG/ML
2.5 SOLUTION RESPIRATORY (INHALATION)
Status: DISCONTINUED | OUTPATIENT
Start: 2022-09-07 | End: 2022-09-07 | Stop reason: HOSPADM

## 2022-09-07 RX ORDER — SODIUM CHLORIDE 0.9 % (FLUSH) 0.9 %
5-40 SYRINGE (ML) INJECTION EVERY 8 HOURS
Status: DISCONTINUED | OUTPATIENT
Start: 2022-09-07 | End: 2022-09-07 | Stop reason: HOSPADM

## 2022-09-07 RX ORDER — ROCURONIUM BROMIDE 10 MG/ML
INJECTION, SOLUTION INTRAVENOUS AS NEEDED
Status: DISCONTINUED | OUTPATIENT
Start: 2022-09-07 | End: 2022-09-07 | Stop reason: HOSPADM

## 2022-09-07 RX ORDER — HYDROMORPHONE HYDROCHLORIDE 1 MG/ML
1 INJECTION, SOLUTION INTRAMUSCULAR; INTRAVENOUS; SUBCUTANEOUS
Status: DISCONTINUED | OUTPATIENT
Start: 2022-09-07 | End: 2022-09-09 | Stop reason: HOSPADM

## 2022-09-07 RX ORDER — LOSARTAN POTASSIUM 50 MG/1
100 TABLET ORAL DAILY
Status: DISCONTINUED | OUTPATIENT
Start: 2022-09-08 | End: 2022-09-09 | Stop reason: HOSPADM

## 2022-09-07 RX ORDER — METOPROLOL SUCCINATE 25 MG/1
25 TABLET, EXTENDED RELEASE ORAL DAILY
Status: DISCONTINUED | OUTPATIENT
Start: 2022-09-08 | End: 2022-09-09 | Stop reason: HOSPADM

## 2022-09-07 RX ORDER — SODIUM CHLORIDE, SODIUM LACTATE, POTASSIUM CHLORIDE, CALCIUM CHLORIDE 600; 310; 30; 20 MG/100ML; MG/100ML; MG/100ML; MG/100ML
125 INJECTION, SOLUTION INTRAVENOUS CONTINUOUS
Status: DISCONTINUED | OUTPATIENT
Start: 2022-09-07 | End: 2022-09-07

## 2022-09-07 RX ORDER — MAGNESIUM CITRATE
296 SOLUTION, ORAL ORAL
Status: DISCONTINUED | OUTPATIENT
Start: 2022-09-07 | End: 2022-09-07 | Stop reason: CLARIF

## 2022-09-07 RX ORDER — NEOSTIGMINE METHYLSULFATE 1 MG/ML
INJECTION, SOLUTION INTRAVENOUS AS NEEDED
Status: DISCONTINUED | OUTPATIENT
Start: 2022-09-07 | End: 2022-09-07 | Stop reason: HOSPADM

## 2022-09-07 RX ORDER — HYDROMORPHONE HYDROCHLORIDE 1 MG/ML
0.5 INJECTION, SOLUTION INTRAMUSCULAR; INTRAVENOUS; SUBCUTANEOUS
Status: DISCONTINUED | OUTPATIENT
Start: 2022-09-07 | End: 2022-09-07 | Stop reason: HOSPADM

## 2022-09-07 RX ADMIN — HYDROMORPHONE HYDROCHLORIDE 1 MG: 2 INJECTION, SOLUTION INTRAMUSCULAR; INTRAVENOUS; SUBCUTANEOUS at 13:33

## 2022-09-07 RX ADMIN — Medication 3 G: at 13:22

## 2022-09-07 RX ADMIN — ROCURONIUM BROMIDE 50 MG: 10 INJECTION, SOLUTION INTRAVENOUS at 13:19

## 2022-09-07 RX ADMIN — LIDOCAINE HYDROCHLORIDE 100 MG: 20 INJECTION, SOLUTION INTRAVENOUS at 13:19

## 2022-09-07 RX ADMIN — SODIUM CHLORIDE, SODIUM LACTATE, POTASSIUM CHLORIDE, AND CALCIUM CHLORIDE: 600; 310; 30; 20 INJECTION, SOLUTION INTRAVENOUS at 13:07

## 2022-09-07 RX ADMIN — SODIUM CHLORIDE, SODIUM LACTATE, POTASSIUM CHLORIDE, AND CALCIUM CHLORIDE: 600; 310; 30; 20 INJECTION, SOLUTION INTRAVENOUS at 13:37

## 2022-09-07 RX ADMIN — DEXAMETHASONE SODIUM PHOSPHATE 10 MG: 4 INJECTION, SOLUTION INTRAMUSCULAR; INTRAVENOUS at 13:33

## 2022-09-07 RX ADMIN — Medication 3 MG: at 14:01

## 2022-09-07 RX ADMIN — ONDANSETRON HYDROCHLORIDE 4 MG: 2 INJECTION INTRAMUSCULAR; INTRAVENOUS at 13:33

## 2022-09-07 RX ADMIN — OXYCODONE AND ACETAMINOPHEN 1 TABLET: 5; 325 TABLET ORAL at 21:27

## 2022-09-07 RX ADMIN — FENTANYL CITRATE 25 MCG: 50 INJECTION, SOLUTION INTRAMUSCULAR; INTRAVENOUS at 15:05

## 2022-09-07 RX ADMIN — FENTANYL CITRATE 25 MCG: 50 INJECTION, SOLUTION INTRAMUSCULAR; INTRAVENOUS at 15:12

## 2022-09-07 RX ADMIN — KETOROLAC TROMETHAMINE 30 MG: 30 INJECTION, SOLUTION INTRAMUSCULAR at 18:53

## 2022-09-07 RX ADMIN — HYDROMORPHONE HYDROCHLORIDE 1 MG: 1 INJECTION, SOLUTION INTRAMUSCULAR; INTRAVENOUS; SUBCUTANEOUS at 16:33

## 2022-09-07 RX ADMIN — SODIUM CHLORIDE, PRESERVATIVE FREE 10 ML: 5 INJECTION INTRAVENOUS at 21:28

## 2022-09-07 RX ADMIN — PROPOFOL 150 MG: 10 INJECTION, EMULSION INTRAVENOUS at 13:19

## 2022-09-07 RX ADMIN — METOCLOPRAMIDE 10 MG: 5 INJECTION, SOLUTION INTRAMUSCULAR; INTRAVENOUS at 13:33

## 2022-09-07 RX ADMIN — DEXTROSE, SODIUM CHLORIDE, AND POTASSIUM CHLORIDE 100 ML/HR: 5; .45; .15 INJECTION INTRAVENOUS at 16:33

## 2022-09-07 RX ADMIN — FENTANYL CITRATE 100 MCG: 50 INJECTION, SOLUTION INTRAMUSCULAR; INTRAVENOUS at 13:10

## 2022-09-07 RX ADMIN — ONDANSETRON 4 MG: 2 INJECTION INTRAMUSCULAR; INTRAVENOUS at 18:52

## 2022-09-07 RX ADMIN — SODIUM CHLORIDE, SODIUM LACTATE, POTASSIUM CHLORIDE, AND CALCIUM CHLORIDE 125 ML/HR: 600; 310; 30; 20 INJECTION, SOLUTION INTRAVENOUS at 09:44

## 2022-09-07 RX ADMIN — GLYCOPYRROLATE 0.4 MG: 0.2 INJECTION INTRAMUSCULAR; INTRAVENOUS at 14:01

## 2022-09-07 RX ADMIN — MIDAZOLAM 2 MG: 1 INJECTION INTRAMUSCULAR; INTRAVENOUS at 13:07

## 2022-09-07 RX ADMIN — Medication 200 MCG: at 13:38

## 2022-09-07 NOTE — PROGRESS NOTES
[address] care at this time. Patient alert and oriented x4. Denies SOB, chest pain. Shows no signs of distress. Patient lungs clear bilaterally. Cap refill < 3 sec to all extremities. Patient has 20 G IV to R cephalic CDI. Stated pain 8/10. Dressing to abdomen with binder is CDI. DANIELITO with sanguineous output. SCDs applied to BLE. Incentive spirometer at bedside. Patient reaches 650 on IS. Bowel sounds present. Skin intact. Patient call light and possessions within reach. Bed locked and in lowest position. 1930  Bedside and verbal shift change report given to [facility] by Divine Pike RN. Report included SBAR, kardex, MAR, and recent results. Patient has not yet voided post op. Has ambulated 7 ft but became lightheaded/nauseated. Administered zofran. Pain controlled prn meds.

## 2022-09-07 NOTE — ROUTINE PROCESS
1548  TRANSFER - IN REPORT:    Verbal report received from [facility] on [address]  being received from PACU for routine post - op      Report consisted of patients Situation, Background, Assessment and   Recommendations(SBAR). Information from the following report(s) SBAR, Kardex, OR Summary, Intake/Output, MAR, and Recent Results was reviewed with the receiving nurse. Opportunity for questions and clarification was provided. Assessment will be completed upon patients arrival to unit and care assumed.

## 2022-09-07 NOTE — H&P
History of Present Illness  1. CT Results   Large hernia complex containing bowel. She is having full ness and more pain. Previous gastric bypass and panniculectomy. Problem List  Problem List reviewed. Past Medical/Surgical History   (Detailed)      Allergies  Ingredient  Reaction (Severity)  Medication Name  Comment  NO KNOWN ALLERGIES        Reviewed, no changes. Family History   (Detailed)        Social History  (Detailed)  Tobacco use reviewed. Preferred language is [place]. Marital Status/Family/Social Support  Marital status:     Tobacco use status: Current non-smoker. Smoking status: Never smoker. Tobacco Screening  Patient has never used tobacco. Patient has not used tobacco in the last 30 days. Patient has not used smokeless tobacco in the last 30 days. Smoking Status  Type  Smoking Status  Usage Per Day  Years Used  Pack Years  Total Pack Years    Never smoker                Review of Systems  System  Neg/Pos  Details  Constitutional  Negative  Fever, Night sweats and Weight loss. ENMT  Negative  Hearing loss, Tinnitus, Vertigo and Voice change. Eyes  Negative  Diplopia and Vision loss. Respiratory  Negative  Asthma, Cough, Dyspnea, Hemoptysis, Known TB exposure and Wheezing. Cardio  Negative  Chest pain, Claudication, Edema, Irregular heartbeat/palpitations and Thrombophlebitis. GI  Negative  Bloating, Dysphagia, Hemorrhoids, Jaundice and Reflux.   Negative  Dysuria, Nocturia, Passage stone/gravel and Urinary incontinence. Endocrine  Negative  Cold intolerance and Goiter. Neuro  Negative  Focal weakness, Headache, Paresthesia, Seizures and Syncope. Integumentary  Negative  Change in shape/size of mole(s) and Skin lesion. MS  Negative  Back pain, Bone/joint symptoms and Muscle weakness. Hema/Lymph  Negative  Easy bleeding and Easy bruising. Allergic/Immuno  Negative  Contact allergy and Contact dermatitis.         Vital Signs  Height  Time  ft  in  cm  Last Measured  Height Position  11:44 AM  5.0  3.00  160.02  07/28/2022  Standing    Weight/BSA/BMI  Time  lb  oz  kg  Context  BMI kg/m2  BSA m2  11:44 AM  289.00    131.088  dressed with shoes  51.19      Blood Pressure  Time  BP mm/Hg  Position  Side  Site  Method  Cuff Size  11:44 AM  128/82  sitting  left  arm  manual  adult    Temperature/Pulse/Respiration  Time  Temp F  Temp C  Temp Site  Pulse/min  Pattern  Resp/ min  11:44 AM  97.70  36.50    79  regular      Pulse Oximetry/FIO2  Time  Pulse Ox (Rest %)  Pulse Ox (Amb %)  O2 Sat  O2 L/Min  Timing  FiO2 %  L/min  Delivery Method  Finger Probe  11:44 AM  99    RA            R Index    Pain Scale  Time  Pain Score  Method  11:44 AM  0/10  Numeric Pain Intensity Scale    Measured by  Time  Measured by  11:44 AM  Chela Brink      Physical Exam    Exam  Findings  Details  Constitutional  Normal  Well developed. Eyes  Normal  Conjunctiva - Right: Normal, Left: Normal. Pupil - Right: Normal.  Neck Exam  Normal  Inspection - Normal.  Respiratory  Normal  Inspection - Normal. Auscultation - Normal. Effort - Normal.  Cardiovascular  Normal  Regular rate and rhythm. No murmurs, gallops, or rubs. Vascular  Normal  Capillary refill - Less than 2 seconds. Abdomen  *  Obese. Hernia - Positive. Type: incisional. Non reducible. soft . Abdomen  Normal  Umbilicus - Normal. No abdominal tenderness. No hepatic enlargement. No spleen enlargement. Non reducible  Skin  Normal  Inspection - Normal.  Musculoskeletal  Normal  Visual overview of all four extremities is normal.  Extremity  Normal  No edema. Neurological  Normal  Memory - Normal. Cranial nerves - Cranial nerves II through XII grossly intact. Psychiatric  Normal  Orientation - Oriented to time, place, person & situation. Appropriate mood and affect.             Completed Orders (This Visit)  Order  Details  Reason  Side  Interpretation  Result  Initial Treatment Date  Region  Lifestyle education regarding diet                    Assessment/Plan  #  Detail Type  Description   1. Assessment  Incisional hernia (K43.2). Patient Plan  Discussed options and her risks. Would be preferable to lose weight preop if possible. Will have her see bariatrics weight loss physician. She wants to have the procedure. She is at high risks for infection and recurrence. I have discussed the risks benefits and alternatives of the procedure to the patient including bleeding, infection, use of mesh, myofascial release, chronic post op pain, reason and side effects of nerve resections, recurrence. They understand and wish to proceed. 2.  Assessment  Body mass index (BMI) 50-59.9 , adult (Z68.43). Plan Orders  Today's instructions / counseling include(s) Lifestyle education regarding diet. Raciel Mancia MD -Bariatric Surgery. Clinical information/comments: Weight Loss plan. Pain Management Plan  Pain Scale: 0/10. Method: Numeric Pain Intensity Scale. Instruction(s)/Education:  Lifestyle education regarding diet      Medication Reconciliation  Medications reconciled today.       Medications (Started, Stopped or Renewed this visit)  Start Date  Medication  Directions  PRN Status  PRN Reason  Instruction  Stop Date  11/03/2021  albuterol sulfate 2.5 mg/3 mL (0.083 %) solution for nebulization  TAKE 3 ML BY NEBULIZATION 4 TIMES A DAY AS NEEDED FOR WHEEZING OR SHORTNESS OF BREATH  N        11/03/2021  albuterol sulfate HFA 90 mcg/actuation aerosol inhaler    N        11/03/2021  benzonatate 100 mg capsule    N        08/17/2021  cephalexin 250 mg capsule    N      07/28/2022 08/06/2021  cephalexin 500 mg capsule  TAKE ONE CAPSULE TWICE A DAY WITH MEALS  N      07/28/2022 05/22/2022  ergocalciferol (vitamin D2) 1,250 mcg (50,000 unit) capsule    N        04/09/2013  ibuprofen 800 mg tablet  take 1 tablet (800MG)  by oral route 3 times every day with food  N        06/06/2022  losartan 100 mg tablet  TAKE 1 TABLET BY MOUTH EVERY DAY  N        06/19/2022  metoprolol succinate ER 25 mg tablet,extended release 24 hr    N        07/30/2021  oxycodone-acetaminophen 5 mg-325 mg tablet  TAKE 1 TABLET BY MOUTH EVERY 6HRS AS NEEDED FOR PAIN FOR UP TO 7 DAYS.  MAX DAILY AMOUNT 4 TABLETS  N        06/26/2022  rosuvastatin 5 mg tablet    N        09/14/2021  spironolactone 25 mg-hydrochlorothiazide 25 mg tablet    N              Orders    Instruction(s)/Education  Assessment  Instruction  F36.88  Lifestyle education regarding diet

## 2022-09-07 NOTE — BRIEF OP NOTE
Brief Postoperative Note    Patient: Lauryn Olson  YOB: 1960  MRN: [id number]    Date of Procedure: 9/7/2022     Pre-Op Diagnosis: INCISIONAL HERNIA    Post-Op Diagnosis: Same as preoperative diagnosis. Procedure(s):  OPEN REPAIR INCARCERATED  INCISIONAL HERNIA WITH MESH, MYOFASCIAL RELEASE    Surgeon(s): Darci Madison MD    Surgical Assistant: Surg Asst-1: Nan Castillo; Hauris, Dedrick Bamberger    Anesthesia: General     Estimated Blood Loss (mL): Minimal    Complications: None    Specimens: * No specimens in log *     Implants:   Implant Name Type Inv.  Item Serial No.  Lot No. LRB No. Used Action   MESH SURG [id number] POLY PGLA CLLGN FLM RIG ABSRB EXP SEMI - [id number]  MESH SURG [id number] POLY PGLA CLLGN FLM RIG ABSRB EXP SEMI  [employer] US SURGICAL_WD [id number] N/A 1 Implanted   MESH SURG DIA6.6CM POLY PGLA CLLGN FLM RIG ABSRB EXP SEMI - [id number]  MESH SURG DIA6.6CM POLY PGLA CLLGN FLM RIG ABSRB EXP SEMI  [employer] COVIDIEN US SURGICAL_WD [id number] N/A 1 Implanted       Drains:   [place] Drain 09/07/22 Left Abdomen (Active)       Findings: inc hernia      Electronically Signed by Veda Magallanes MD on 9/7/2022 at 2:01 PM

## 2022-09-07 NOTE — ANESTHESIA POSTPROCEDURE EVALUATION
Procedure(s):  OPEN REPAIR INCARCERATED  INCISIONAL HERNIA WITH MESH, MYOFASCIAL RELEASE.    general    Anesthesia Post Evaluation        Patient location during evaluation: PACU  Patient participation: complete - patient participated  Level of consciousness: awake and alert  Pain score: 1  Pain management: adequate  Airway patency: patent  Anesthetic complications: no  Cardiovascular status: acceptable  Respiratory status: acceptable  Hydration status: acceptable  Post anesthesia nausea and vomiting:  none  Final Post Anesthesia Temperature Assessment:  Normothermia (36.0-37.5 degrees C)      INITIAL Post-op Vital signs:   Vitals Value Taken Time   /59 09/07/22 1512   Temp 36.2 °C (97.1 °F) 09/07/22 1442   Pulse 54 09/07/22 1516   Resp 17 09/07/22 1516   SpO2 94 % 09/07/22 1516   Vitals shown include unvalidated device data.

## 2022-09-07 NOTE — PERIOP NOTES
Reviewed PTA medication list with patient/caregiver and patient/caregiver denies any additional medications. Patient admits to having a responsible adult care for them at home for at least 24 hours after surgery. Patient encouraged to use gown warming system and informed that using said warming gown to regulate body temperature prior to a procedure has been shown to help reduce the risks of blood clots and infection. Patient's pharmacy of choice verified and documented in PTA medication section. Dual skin assessment & fall risk band verification completed with CHANDA CHUN RN.

## 2022-09-07 NOTE — PERIOP NOTES
Patient transported to room 206 via stretcher. Assisted from stretcher to bed, tolerated well. Dressing CDI, no bleeding, patient is stable, dual skin assessment completed with Lizzette LOGAN, all questions answered. Blood pressure (!) 147/60, pulse 62, temperature 97.6 °F (36.4 °C), resp. rate 18, height 5' 3.25\" (1.607 m), weight 132 kg (291 lb), last menstrual period 06/30/2014, SpO2 100 %.

## 2022-09-07 NOTE — PERIOP NOTES
TRANSFER - OUT REPORT:    Verbal report given to Lizzette RN(name) on Raisa Farrar  being transferred to [address] [place] [address](unit) for routine progression of care       Report consisted of patients Situation, Background, Assessment and   Recommendations(SBAR). Information from the following report(s) SBAR, OR Summary, Procedure Summary, Intake/Output, MAR, and Recent Results was reviewed with the receiving nurse. Lines:   Peripheral IV 28/97/67 Right Cephalic (Active)   Site Assessment Clean, dry, & intact 09/07/22 1549   Phlebitis Assessment 0 09/07/22 1549   Infiltration Assessment 0 09/07/22 1549   Dressing Status Clean, dry, & intact 09/07/22 1549   Dressing Type Transparent;Tape 09/07/22 1549   Hub Color/Line Status Pink; Infusing 09/07/22 1549        Opportunity for questions and clarification was provided.       Patient transported with:   O2 @ 3 liters  Registered Nurse

## 2022-09-07 NOTE — ANESTHESIA PREPROCEDURE EVALUATION
Relevant Problems   RESPIRATORY SYSTEM   (+) Asthma      CARDIOVASCULAR   (+) Hypertension      GASTROINTESTINAL   (+) GERD (gastroesophageal reflux disease)      ENDOCRINE   (+) Arthritis       Anesthetic History     PONV          Review of Systems / Medical History  Patient summary reviewed, nursing notes reviewed and pertinent labs reviewed    Pulmonary        Sleep apnea: No treatment    Asthma : well controlled  Pertinent negatives: No smoker     Neuro/Psych   Within defined limits           Cardiovascular    Hypertension: well controlled              Exercise tolerance: >4 METS     GI/Hepatic/Renal     GERD: well controlled        Pertinent negatives: No hiatal hernia   Endo/Other        Morbid obesity and arthritis     Other Findings              Physical Exam    Airway  Mallampati: II  TM Distance: > 6 cm  Neck ROM: normal range of motion   Mouth opening: Normal     Cardiovascular    Rhythm: regular  Rate: normal         Dental         Pulmonary  Breath sounds clear to auscultation               Abdominal  GI exam deferred       Other Findings            Anesthetic Plan    ASA: 3  Anesthesia type: general          Induction: Intravenous  Anesthetic plan and risks discussed with: Patient

## 2022-09-07 NOTE — PERIOP NOTES
TRANSFER - IN REPORT:    Verbal report received from Umer Cohen CRNA(name) on Bridgette Dunne  being received from OR(unit) for routine progression of care      Report consisted of patients Situation, Background, Assessment and   Recommendations(SBAR). Information from the following report(s) SBAR, OR Summary, Procedure Summary, Intake/Output, MAR, and Recent Results was reviewed with the receiving nurse. Opportunity for questions and clarification was provided. Assessment completed upon patients arrival to unit and care assumed.

## 2022-09-08 PROCEDURE — 74011250636 HC RX REV CODE- 250/636: Performed by: SURGERY

## 2022-09-08 PROCEDURE — 74011000250 HC RX REV CODE- 250: Performed by: SURGERY

## 2022-09-08 PROCEDURE — 96374 THER/PROPH/DIAG INJ IV PUSH: CPT

## 2022-09-08 PROCEDURE — 96375 TX/PRO/DX INJ NEW DRUG ADDON: CPT

## 2022-09-08 PROCEDURE — 74011250637 HC RX REV CODE- 250/637: Performed by: SURGERY

## 2022-09-08 PROCEDURE — G0378 HOSPITAL OBSERVATION PER HR: HCPCS

## 2022-09-08 RX ADMIN — OXYCODONE AND ACETAMINOPHEN 1 TABLET: 5; 325 TABLET ORAL at 04:38

## 2022-09-08 RX ADMIN — OXYCODONE AND ACETAMINOPHEN 1 TABLET: 5; 325 TABLET ORAL at 11:30

## 2022-09-08 RX ADMIN — SPIRONOLACTONE: 25 TABLET, FILM COATED ORAL at 09:00

## 2022-09-08 RX ADMIN — DEXTROSE, SODIUM CHLORIDE, AND POTASSIUM CHLORIDE 100 ML/HR: 5; .45; .15 INJECTION INTRAVENOUS at 01:18

## 2022-09-08 RX ADMIN — DEXTROSE, SODIUM CHLORIDE, AND POTASSIUM CHLORIDE 100 ML/HR: 5; .45; .15 INJECTION INTRAVENOUS at 21:04

## 2022-09-08 RX ADMIN — OXYCODONE AND ACETAMINOPHEN 1 TABLET: 5; 325 TABLET ORAL at 18:31

## 2022-09-08 RX ADMIN — PANTOPRAZOLE SODIUM 40 MG: 40 TABLET, DELAYED RELEASE ORAL at 06:40

## 2022-09-08 RX ADMIN — SODIUM CHLORIDE, PRESERVATIVE FREE 10 ML: 5 INJECTION INTRAVENOUS at 06:40

## 2022-09-08 RX ADMIN — KETOROLAC TROMETHAMINE 30 MG: 30 INJECTION, SOLUTION INTRAMUSCULAR at 01:18

## 2022-09-08 RX ADMIN — KETOROLAC TROMETHAMINE 30 MG: 30 INJECTION, SOLUTION INTRAMUSCULAR at 21:04

## 2022-09-08 RX ADMIN — METOPROLOL SUCCINATE 25 MG: 25 TABLET, EXTENDED RELEASE ORAL at 09:00

## 2022-09-08 RX ADMIN — LOSARTAN POTASSIUM 100 MG: 50 TABLET, FILM COATED ORAL at 09:00

## 2022-09-08 NOTE — PROGRESS NOTES
JAGDEEP  Feels OK but still has pain  And - normal post op  Doesn't feel like she can go home today.     Continue pain meds as needed  OOB

## 2022-09-08 NOTE — PROGRESS NOTES
1931 - Head to toe assessment completed at this time. Pt denies CP and SOB. Pt rated pain 4/10. NO distress noted. Midline incision to abdomen with dermabond LAYLA and abdominal binder in place. 70Z Right Cephalic IVF infusing as ordered. DANIELITO drain in place draining sanguinous fluid. SCD in place bilaterally as ordered. Call bell and personal items within reach. Bed in lowest position. Will continue to monitor. 0118 - Pt rated pain 7/10 and medicated per MAR. Pt stated that when she coughed earlier she did splint with a pillow but since the cough her left side has been hurting really bad and is concerning to her. Will pass on her concerns to oncoming nurse and pt encouraged to share her concerns with the Doctor in the morning. 2286 - Pt rated pain /10 and medicate per MAR.

## 2022-09-08 NOTE — ROUTINE PROCESS
Bedside and Verbal shift change report given to HUSSEIN Arriaga RN (oncoming nurse) by ROCAEL Snell RN (offgoing nurse). Report included the following information SBAR, Kardex, OR Summary, Intake/Output, MAR, and Recent Results.

## 2022-09-08 NOTE — PROGRESS NOTES
conducted an initial consultation and Spiritual Assessment for Virginie Cox, who is a 65 y.o.,female. Patients Primary Language is: [place]. According to the patients EMR Amish Affiliation is: [facility].     The reason the Patient came to the hospital is:   Patient Active Problem List    Diagnosis Date Noted    Incisional hernia 09/07/2022    Right foot pain 07/30/2021    Difficulty walking 07/30/2021    Status post gastric bypass for obesity     Intestinal malabsorption     Morbid obesity with body mass index (BMI) of 50.0 to 59.9 in adult [facility])     Hypertension     Arthritis     Asthma     GERD (gastroesophageal reflux disease)     Lymph edema     Venous ulcer ([facility] [address]     Hypercholesterolemia     Smoking history     Right knee DJD 03/29/2015        The  provided the following Interventions:  Initiated a relationship of care and support. Listened empathically. Provided information about Spiritual Care Services. Addressed patient's ACP and provided education on the form. Offered assurance of prayer on patient's behalf. Chart reviewed. The following outcomes where achieved:   confirmed Patient's Amish Affiliation. Provided Devotional material.  Patient processed feeling about current hospitalization. Patient expressed gratitude for 's visit. Assessment:  Patient does not have any Buddhism/cultural needs that will affect patients preferences in health care. Plan:  Chaplains will continue to follow and will provide pastoral care on an as needed/requested basis.  recommends bedside caregivers page  on duty if patient shows signs of acute spiritual or emotional distress. Rev.  Tan Covert, Certified Respecting [address] [place] [address]  [phone]

## 2022-09-08 NOTE — PROGRESS NOTES
D/c plan: Home w/ family assistance. Spouse to transport. CM met w/ pt at bedside. Confirmed pt's face sheet information. Confirmed pt's spouse will transport her home and will assist w/ her recovery. Pt is independent in all ADLs and IDLs at base line. No CM d/c needs identified. Transition of Care (JENNIFER) Plan:          Pt admitted for an elective surgical procedure. Pt is independent. OPEN REPAIR INCARCERATED  INCISIONAL HERNIA WITH MESH, MYOFASCIAL RELEASE Please encourage ambulation. No transition of care needs identified at this time. Anticipate pt will be medically stable for discharge within the next 24-48 hours with physician follow up. CM available to assist as needed. JENNIFER Transportation:   How is patient being transported at discharge? spouse      When? Once cleared by physician     Is transport scheduled? N/A      Follow-up appointment and transportation:   PCP/Specialist?  See AVS for Appointment         Who is transporting to the follow-up appointment? Spouse      Is transport for follow up appointment scheduled? N/A    Communication plan (with patient/family): Who is being called? Patient     Responsible party? Patient      What number(s) is to be used? See Facesheet      What service provider is calling for [facility] services? N/a          When are they calling? N/a    Readmission Risk? (Green/Low; Yellow/Moderate; Red/High):  [employer] here to complete [address] including selection of the Healthcare Decision Maker Relationship (ie \"Primary\")    Care Management Interventions  PCP Verified by CM: Yes (Dr. Tesha Rodriguez )  Palliative Care Criteria Met (RRAT>21 & CHF Dx)?: No  Mode of Transport at Discharge:  Other (see comment) (Spouse. )  Transition of Care Consult (CM Consult): Discharge Planning (home w/ family assistance. )  Discharge Durable Medical Equipment: No  Physical Therapy Consult: No  Occupational Therapy Consult: No  Speech Therapy Consult: No  Support Systems: Spouse/Significant Other, Child(kadeem)  Confirm Follow Up Transport: Family  The Plan for Transition of Care is Related to the Following Treatment Goals : home w/ family assistance.   The Patient and/or Patient Representative was Provided with a Choice of Provider and Agrees with the Discharge Plan?: Yes (The pt is alert and oriented. )  Freedom of Choice List was Provided with Basic Dialogue that Supports the Patient's Individualized Plan of Care/Goals, Treatment Preferences and Shares the Quality Data Associated with the Providers?:  (n/a)  Discharge Location  Patient Expects to be Discharged to[de-identified] Home with family assistance

## 2022-09-08 NOTE — PROGRESS NOTES
Problem: Falls - Risk of  Goal: *Absence of Falls  Description: Document Clydene Bone Fall Risk and appropriate interventions in the flowsheet. Outcome: Progressing Towards Goal  Note: Fall Risk Interventions:  Mobility Interventions: Communicate number of staff needed for ambulation/transfer, Patient to call before getting OOB         Medication Interventions: Teach patient to arise slowly    Elimination Interventions: Call light in reach              Problem: Patient Education: Go to Patient Education Activity  Goal: Patient/Family Education  Outcome: Progressing Towards Goal     Problem: Surgical Pathway Day of Surgery  Goal: Activity/Safety  Outcome: Progressing Towards Goal  Goal: Nutrition/Diet  Outcome: Progressing Towards Goal  Goal: Medications  Outcome: Progressing Towards Goal  Goal: Respiratory  Outcome: Progressing Towards Goal  Goal: Treatments/Interventions/Procedures  Outcome: Progressing Towards Goal  Goal: Psychosocial  Outcome: Progressing Towards Goal  Goal: *No signs and symptoms of infection or wound complications  Outcome: Progressing Towards Goal  Goal: *Optimal pain control at patient's stated goal  Outcome: Progressing Towards Goal  Goal: *Adequate urinary output (equal to or greater than 30 milliliters/hour)  Description: Ambulatory Surgery patients voiding without difficulty.   Outcome: Progressing Towards Goal  Goal: *Hemodynamically stable  Outcome: Progressing Towards Goal  Goal: *Tolerating diet  Outcome: Progressing Towards Goal  Goal: *Demonstrates progressive activity  Outcome: Progressing Towards Goal     Problem: Pain  Goal: *Control of Pain  Outcome: Progressing Towards Goal     Problem: Patient Education: Go to Patient Education Activity  Goal: Patient/Family Education  Outcome: Progressing Towards Goal

## 2022-09-08 NOTE — PROGRESS NOTES
8986 - Bedside shift report received from Thea Jules RN. Assumed care of patient. Patient noted resting in bed at this time. Call light in reach. 1087 - Assessment completed. Patient is alert and oriented x 4. Lung sounds clear bilaterally. Respirations even and unlabored. No use of accessory muscles. Abdomen is obese and tender. Reports last BM on yesterday. Patient voiding without difficulty. Skin is warm, dry and skin color is appropriate to race. Skin glue intact to midline abdominal incision. Gauze dressing to DANIELITO drain site noted CDI. No other skin integrity issues present. Tate hose to BLE. Sequential compression device to BLE. IV intact to right wrist and infusing without difficulty. Reports pain 6/10. Ice packs applied. Call bell within reach. Bed in low position. Three side rails up. 6/10 Last BM yesterday. 1130 - PRN Percocet administered for 5/10 abdominal pain. 1630 - Patient ambulated in hallway. 1831 - PRN Percocet administered for 8/10 pain to abdomen. Patient ambulated to restroom, voided without difficulty, and returned to bed. Call light in reach.

## 2022-09-09 VITALS
BODY MASS INDEX: 51.56 KG/M2 | TEMPERATURE: 97.9 F | RESPIRATION RATE: 17 BRPM | SYSTOLIC BLOOD PRESSURE: 124 MMHG | OXYGEN SATURATION: 98 % | DIASTOLIC BLOOD PRESSURE: 67 MMHG | HEIGHT: 63 IN | HEART RATE: 55 BPM | WEIGHT: 291 LBS

## 2022-09-09 PROCEDURE — 74011250637 HC RX REV CODE- 250/637: Performed by: SURGERY

## 2022-09-09 PROCEDURE — 96376 TX/PRO/DX INJ SAME DRUG ADON: CPT

## 2022-09-09 PROCEDURE — G0378 HOSPITAL OBSERVATION PER HR: HCPCS

## 2022-09-09 PROCEDURE — 74011000250 HC RX REV CODE- 250: Performed by: SURGERY

## 2022-09-09 PROCEDURE — 74011250636 HC RX REV CODE- 250/636: Performed by: SURGERY

## 2022-09-09 RX ORDER — NALOXONE HYDROCHLORIDE 4 MG/.1ML
SPRAY NASAL
Qty: 1 EACH | Refills: 0 | Status: SHIPPED | OUTPATIENT
Start: 2022-09-09

## 2022-09-09 RX ORDER — OXYCODONE AND ACETAMINOPHEN 5; 325 MG/1; MG/1
1 TABLET ORAL
Qty: 20 TABLET | Refills: 0 | Status: SHIPPED | OUTPATIENT
Start: 2022-09-09 | End: 2022-09-14

## 2022-09-09 RX ADMIN — DEXTROSE, SODIUM CHLORIDE, AND POTASSIUM CHLORIDE 100 ML/HR: 5; .45; .15 INJECTION INTRAVENOUS at 06:21

## 2022-09-09 RX ADMIN — SODIUM CHLORIDE, PRESERVATIVE FREE 10 ML: 5 INJECTION INTRAVENOUS at 06:21

## 2022-09-09 RX ADMIN — LOSARTAN POTASSIUM 100 MG: 50 TABLET, FILM COATED ORAL at 08:36

## 2022-09-09 RX ADMIN — PANTOPRAZOLE SODIUM 40 MG: 40 TABLET, DELAYED RELEASE ORAL at 08:35

## 2022-09-09 RX ADMIN — OXYCODONE AND ACETAMINOPHEN 1 TABLET: 5; 325 TABLET ORAL at 08:41

## 2022-09-09 RX ADMIN — SPIRONOLACTONE: 25 TABLET, FILM COATED ORAL at 08:36

## 2022-09-09 RX ADMIN — METOPROLOL SUCCINATE 25 MG: 25 TABLET, EXTENDED RELEASE ORAL at 08:36

## 2022-09-09 RX ADMIN — HYDROMORPHONE HYDROCHLORIDE 1 MG: 1 INJECTION, SOLUTION INTRAMUSCULAR; INTRAVENOUS; SUBCUTANEOUS at 04:40

## 2022-09-09 NOTE — DISCHARGE INSTRUCTIONS
Post-Operative Discharge Instructions  Refugio Bullard. Judy Stubbs M.D.  [address] [place] [address], Re Maier [facility]  [phone]    Patient: Adan Ojeda MRN: [id number]  CSN: [id number]    YOB: 1960  Age: 65 y.o. Sex: female    DOA: 9/7/2022 LOS: [unfilled]  Discharge Date: [unfilled]     Acute Diagnoses:  Incisional hernia [K43.2]    Chronic Medical Diagnoses:  [unfilled]    Diet  Resume prior to surgery diet as tolerated. Activity  Do not drive a car or operate any hazardous machinery the day of surgery. Rest quietly today. No bending or heavy lifting. You may resume other prior to surgery activities as tolerated. You may remove the bandage and shower in 1 day. Drain / Wound Care  Follow all drain / wound care instructions exactly as explained by the Nurse at time of discharge. Apply an ice pack to the surgical site for 48 hours. Do not put any salves or ointments on the wound. Allow it to form a dry scab. Leave steri-strips / Dermabond alone. They should be allowed to fall off on their own in 7-14 days. Medications  It is important to take your medications exactly as they are prescribed. Keep your medication in the bottles provided by the pharmacist, and keep a list of the medication names, dosages, and times they should be taken in your wallet. Call 911 anytime you think you may need emergency care. For example, call if:  You passed out (lost consciousness). You have severe trouble breathing. You have sudden chest pain and shortness of breath. Notify your Surgeon for any of the following:  Fever, chills, nausea, vomiting, severe abdominal pain or bleeding. If you experience any redness or discharge or sign of infection. Persistent nausea lasting more than 24 hours. If you are unable to reach your Surgeon for any of the symptoms above, you should proceed directly to the nearest Emergency Department.     Post-Operative Appointment Information    Call  Ailin's office tomorrow morning at [phone] to schedule a post-operative office visit in one (1) week. If any questions or concerns arise, call your Surgeon at [phone].

## 2022-09-09 NOTE — ROUTINE PROCESS
Bedside and Verbal shift change report given to Rosario Benton by RN (oncoming nurse) by Peewee Banegas RN (offgoing nurse). Report included the following information SBAR and Kardex.

## 2022-09-09 NOTE — ROUTINE PROCESS
2343-Shift report received from Rebel Coleman RN. Patient resting quietly in bed. White board update. Stable.

## 2022-09-09 NOTE — PROGRESS NOTES
1933 - Head to toe assessment completed at this time. Pt denies CP and SOB. Pt rated pain 7/10. No distress noted. Midline incision to abdomen with dermabond LAYLA and abdominal binder in place. 89B Right Cephalic IVF infusing as ordered. DANIELITO drain in place draining sanguinous fluid. SCD in place bilaterally as ordered. Call bell and personal items within reach. Bed in lowest position. Will continue to monitor. 2104 - Pt rated pain 7/10 and medicated per MAR.

## 2022-09-09 NOTE — PROGRESS NOTES
Assessment complete. Pt is stable in bed. Pt has abdominal binder in place with DANIELITO drain attached. 40 ml of serosanguinous drainage noticed and drained. Pt complained of pain 6/10. Percocet was given. Pt resting with bed locked and at lowest position and call bell within reach.

## 2022-09-09 NOTE — PROGRESS NOTES
0029-Assessment complete. Stable. Incentive spirometer in use. Abdominal binder in place with DANIELITO attached, scant amount of serosanguinous drainage noticed. Call light an personal items within reach. 0444-No change from initial assessment. Patient medicated for pain at request. CHG wipes completed. Stable. 0600-Resting quietly in bed, stable. No complaints voiced at this time. Bedside and Verbal shift change report given to Tan Thompson (oncoming nurse) by Arnaud Melo RN (offgoing nurse). Report included the following information SBAR and Kardex.

## 2022-09-12 NOTE — OP NOTES
[facility]  OPERATIVE REPORT    Name:  Jose J Mckenzie  MR#:   [id number]  :  1960  ACCOUNT #:  [de-identified]  DATE OF SERVICE:  2022    PREOPERATIVE DIAGNOSIS:  Incisional hernia. POSTOPERATIVE DIAGNOSIS:  Incisional hernia. PROCEDURES PERFORMED:  1. Open repair of complex incisional hernia with mesh. 2.  Bilateral myofascial releases. SURGEON:  Paloma Ayala MD    ASSISTANT:  Anthony. ANESTHESIA:  General endotracheal.    COMPLICATIONS:  None. SPECIMENS REMOVED:  None. IMPLANTS:  None. DRAINS:  [place] x1. ESTIMATED BLOOD LOSS:  .    INDICATIONS:  The patient presents with a complex incisional hernia. She will undergo repair. I have discussed risks, benefits and alternatives to her. She understands and wishes to proceed. PROCEDURE:  She was placed in the supine position. Her abdomen was prepped and draped in the usual fashion. An incision was made over the upper midline supraumbilically to include part of the umbilicus. Incision was carried down to the subcutaneous tissues down to the hernia defects. The patient had two hernia defects. One was a larger hernia and superior to this was a smaller hernia with a small fascial bridge. The hernias were dissected out carefully and the sacs were reduced. Because of the patient's obesity and scar tissue, to allow adequate primary closure bilateral myofascial releases were done anteriorly in the area of the hernia defect locally. This was done anteriorly using the cautery. Once this was completed, two patches were placed. They were Medtronic PCO ventral patches. An 8 cm patch was placed in the inferior lower hernia defect and a 6 cm patch was placed in the superior hernia defect. Both patches were sutured to the anterior abdominal wall using 2-0 PDS interrupted sutures. There was a good overlay and overlap of the mesh.   Next, the fascia was closed over the mesh utilizing a myofascial release as a primary repair with a 0 double-looped nylon continuous suture. Once this was completed, 0.25% Marcaine was injected locally as a TAP block. A 15-Korean round Harsha drain was left in for postoperative drainage. The subcutaneous tissues were closed in layers using 2-0 and 3-0 Vicryl suture. The skin was closed with 4-0 Monocryl subcuticular closure and Dermabond. The patient tolerated the procedure well.       MD TYE [email or web address]  D:  09/12/2022 8:17  T:  09/12/2022 12:01  JOB #:  [id number]

## 2022-09-16 NOTE — ROUTINE PROCESS
Bedside and Verbal shift change report given to Mary Tate RN by Georgina Townsend RN. Report included the following information SBAR and Kardex. 

## 2022-10-08 ENCOUNTER — APPOINTMENT (OUTPATIENT)
Dept: CT IMAGING | Age: 62
End: 2022-10-08
Attending: PHYSICIAN ASSISTANT
Payer: COMMERCIAL

## 2022-10-08 ENCOUNTER — HOSPITAL ENCOUNTER (EMERGENCY)
Age: 62
Discharge: HOME | End: 2022-10-09
Attending: EMERGENCY MEDICINE
Payer: COMMERCIAL

## 2022-10-08 VITALS
WEIGHT: 288 LBS | DIASTOLIC BLOOD PRESSURE: 45 MMHG | OXYGEN SATURATION: 100 % | BODY MASS INDEX: 51.03 KG/M2 | TEMPERATURE: 97.6 F | HEART RATE: 83 BPM | SYSTOLIC BLOOD PRESSURE: 167 MMHG | HEIGHT: 63 IN | RESPIRATION RATE: 16 BRPM

## 2022-10-08 DIAGNOSIS — L02.211 ABSCESS OF SKIN OF ABDOMEN: Primary | ICD-10-CM

## 2022-10-08 LAB
ALBUMIN SERPL-MCNC: 3 G/DL (ref 3.4–5)
ALBUMIN/GLOB SERPL: 0.7 {RATIO} (ref 0.8–1.7)
ALP SERPL-CCNC: 108 U/L (ref 45–117)
ALT SERPL-CCNC: 20 U/L (ref 13–56)
ANION GAP SERPL CALC-SCNC: 6 MMOL/L (ref 3–18)
APPEARANCE UR: CLEAR
AST SERPL-CCNC: 14 U/L (ref 10–38)
BASOPHILS # BLD: 0 K/UL (ref 0–0.1)
BASOPHILS NFR BLD: 1 % (ref 0–2)
BILIRUB SERPL-MCNC: 0.3 MG/DL (ref 0.2–1)
BILIRUB UR QL: NEGATIVE
BUN SERPL-MCNC: 16 MG/DL (ref 7–18)
BUN/CREAT SERPL: 16 (ref 12–20)
CALCIUM SERPL-MCNC: 9.1 MG/DL (ref 8.5–10.1)
CHLORIDE SERPL-SCNC: 104 MMOL/L (ref 100–111)
CK SERPL-CCNC: 140 U/L (ref 26–192)
CO2 SERPL-SCNC: 28 MMOL/L (ref 21–32)
COLOR UR: YELLOW
CREAT SERPL-MCNC: 1.03 MG/DL (ref 0.6–1.3)
DIFFERENTIAL METHOD BLD: ABNORMAL
EOSINOPHIL # BLD: 0.4 K/UL (ref 0–0.4)
EOSINOPHIL NFR BLD: 5 % (ref 0–5)
ERYTHROCYTE [DISTWIDTH] IN BLOOD BY AUTOMATED COUNT: 13.6 % (ref 11.6–14.5)
GLOBULIN SER CALC-MCNC: 4.3 G/DL (ref 2–4)
GLUCOSE SERPL-MCNC: 94 MG/DL (ref 74–99)
GLUCOSE UR STRIP.AUTO-MCNC: NEGATIVE MG/DL
HCT VFR BLD AUTO: 36.8 % (ref 35–45)
HGB BLD-MCNC: 11.4 G/DL (ref 12–16)
HGB UR QL STRIP: NEGATIVE
IMM GRANULOCYTES # BLD AUTO: 0 K/UL (ref 0–0.04)
IMM GRANULOCYTES NFR BLD AUTO: 0 % (ref 0–0.5)
KETONES UR QL STRIP.AUTO: NEGATIVE MG/DL
LACTATE SERPL-SCNC: 0.8 MMOL/L (ref 0.4–2)
LEUKOCYTE ESTERASE UR QL STRIP.AUTO: NEGATIVE
LIPASE SERPL-CCNC: 144 U/L (ref 73–393)
LYMPHOCYTES # BLD: 1.6 K/UL (ref 0.9–3.6)
LYMPHOCYTES NFR BLD: 21 % (ref 21–52)
MCH RBC QN AUTO: 27.7 PG (ref 24–34)
MCHC RBC AUTO-ENTMCNC: 31 G/DL (ref 31–37)
MCV RBC AUTO: 89.5 FL (ref 78–100)
MONOCYTES # BLD: 0.6 K/UL (ref 0.05–1.2)
MONOCYTES NFR BLD: 7 % (ref 3–10)
NEUTS SEG # BLD: 5.1 K/UL (ref 1.8–8)
NEUTS SEG NFR BLD: 67 % (ref 40–73)
NITRITE UR QL STRIP.AUTO: NEGATIVE
NRBC # BLD: 0 K/UL (ref 0–0.01)
NRBC BLD-RTO: 0 PER 100 WBC
PH UR STRIP: 6 [PH] (ref 5–8)
PLATELET # BLD AUTO: 350 K/UL (ref 135–420)
PMV BLD AUTO: 9.4 FL (ref 9.2–11.8)
POTASSIUM SERPL-SCNC: 4.1 MMOL/L (ref 3.5–5.5)
PROT SERPL-MCNC: 7.3 G/DL (ref 6.4–8.2)
PROT UR STRIP-MCNC: NEGATIVE MG/DL
RBC # BLD AUTO: 4.11 M/UL (ref 4.2–5.3)
SODIUM SERPL-SCNC: 138 MMOL/L (ref 136–145)
SP GR UR REFRACTOMETRY: 1.02 (ref 1–1.03)
UROBILINOGEN UR QL STRIP.AUTO: 1 EU/DL (ref 0.2–1)
WBC # BLD AUTO: 7.6 K/UL (ref 4.6–13.2)

## 2022-10-08 PROCEDURE — 87186 SC STD MICRODIL/AGAR DIL: CPT

## 2022-10-08 PROCEDURE — 82550 ASSAY OF CK (CPK): CPT

## 2022-10-08 PROCEDURE — 74011000636 HC RX REV CODE- 636: Performed by: EMERGENCY MEDICINE

## 2022-10-08 PROCEDURE — 80053 COMPREHEN METABOLIC PANEL: CPT

## 2022-10-08 PROCEDURE — 74177 CT ABD & PELVIS W/CONTRAST: CPT

## 2022-10-08 PROCEDURE — 99285 EMERGENCY DEPT VISIT HI MDM: CPT

## 2022-10-08 PROCEDURE — 83605 ASSAY OF LACTIC ACID: CPT

## 2022-10-08 PROCEDURE — 83690 ASSAY OF LIPASE: CPT

## 2022-10-08 PROCEDURE — 85025 COMPLETE CBC W/AUTO DIFF WBC: CPT

## 2022-10-08 PROCEDURE — 87070 CULTURE OTHR SPECIMN AEROBIC: CPT

## 2022-10-08 PROCEDURE — 87040 BLOOD CULTURE FOR BACTERIA: CPT

## 2022-10-08 PROCEDURE — 81003 URINALYSIS AUTO W/O SCOPE: CPT

## 2022-10-08 RX ADMIN — IOPAMIDOL 100 ML: 612 INJECTION, SOLUTION INTRAVENOUS at 22:03

## 2022-10-08 NOTE — ED TRIAGE NOTES
C/o of having double hernia surgery on 9/7 with out issues. 9/27 pt had tubes removed due to infection and was placed on antibiotics, since then pt states its been a increase in drainage ( tan/red color) she changes bandages 6xs a day as well as developing a fever and shills. Surgical sites have healed, the tube drainage site is the issue.  Vss nad noted

## 2022-10-09 NOTE — ED PROVIDER NOTES
EMERGENCY DEPARTMENT HISTORY AND PHYSICAL EXAM    Date: 10/8/2022  Patient Name: Cali Estrada    History of Presenting Illness     Chief Complaint   Patient presents with    Post-Op Problem         History Provided By: Patient    Chief Complaint: post op infection       Additional History (Context):   8:15 PM  Cali Estrada is a 65 y.o. female with PMHX and, GERD, high cholesterol, history of gastric bypass, recent double hernia surgery on 9/7 by Dr. Shreya Leal and had drains in place since his surgery presents to the emergency department C/O drainage from incisional site for the past 6 days. Patient spoke with her surgeon's office on Monday and informed that she was having drainage from the site where tubes were in place. She started on Augmentin that evening. She was seen in their office the following day where the tubes were removed. Patient states she started developing tenderness and swelling at the abdomen after the drains were removed. States has had low-grade fever at night. No deep abdominal pain but has had worsening redness tenderness firmness to the abdominal wall with persistent purulent drainage. No vomiting or diarrhea. PCP: Emily Taylor MD    Current Outpatient Medications   Medication Sig Dispense Refill    naloxone [facility]) 4 mg/actuation nasal spray Use 1 spray intranasally, then discard. Repeat with new spray every 2 min as needed for opioid overdose symptoms, alternating nostrils. 1 Each 0    multivitamin with minerals (HAIR,SKIN AND NAILS PO) Take 1 Tablet by mouth daily. OTHER Take 2 Tablets by mouth daily. Generic probiotic      acetaminophen (TYLENOL) 500 mg tablet Take 500 mg by mouth every six (6) hours as needed for Pain. rosuvastatin (CRESTOR) 5 mg tablet Take 5 mg by mouth nightly. LORazepam (ATIVAN) 0.5 mg tablet Take 0.5 mg by mouth as needed for Anxiety.       benzonatate (TESSALON) 100 mg capsule Take 100 mg by mouth three (3) times daily as needed for Cough. metoprolol succinate (TOPROL-XL) 25 mg XL tablet Take 25 mg by mouth daily. ibuprofen (MOTRIN) 800 mg tablet Take 800 mg by mouth every eight (8) hours as needed for Pain. fluticasone propion-salmeteroL (ADVAIR/WIXELA) 250-50 mcg/dose diskus inhaler Take 1 Puff by inhalation every twelve (12) hours. magnesium oxide 500 mg tab Take 1 Tablet by mouth daily. losartan (COZAAR) 100 mg tablet Take 100 mg by mouth daily. spironolactone-hydrochlorothiazide (ALDACTAZIDE) 25-25 mg per tablet Take 1 Tab by mouth daily. albuterol-ipratropium (DUO-NEB) 2.5 mg-0.5 mg/3 ml nebu 3 mL by Nebulization route as needed. omeprazole (PRILOSEC) 20 mg capsule Take 20 mg by mouth daily. Pt instructed to take with a small bit of water on DOS      b complex vitamins tablet Take 3 Tablets by mouth daily. multivitamin (ONE A DAY) tablet Take 1 Tab by mouth daily. albuterol (PROVENTIL HFA, VENTOLIN HFA, PROAIR HFA) 90 mcg/actuation inhaler Take 2 Puffs by inhalation every four (4) hours as needed for Wheezing.          Past History     Past Medical History:  Past Medical History:   Diagnosis Date    Adverse effect of anesthesia     had astmatic attack after having anesthesia for colonoscopy    Arthritis     back - has needed chronic pain manager    Asthma     severe / requires steroids    Chronic pain     back    GERD (gastroesophageal reflux disease)     uses PPI    Hypercholesterolemia     Hypertension     Intestinal malabsorption     Lymph edema     both legs    Morbid obesity ([address]     Morbid obesity with body mass index (BMI) of 50.0 to 59.9 in adult (HCC)     Nausea & vomiting     Smoking history     quit late 80s    Status post gastric bypass for obesity     erica liana    Unspecified sleep apnea     Cpap use in the past    Venous ulcer ([address]     history of       Past Surgical History:  Past Surgical History:   Procedure Laterality Date    COLONOSCOPY,DIAGNOSTIC      in conjunction with open gastric bypass    HX ABDOMINOPLASTY      HX BUNIONECTOMY Bilateral     HX  SECTION      X 2    HX GASTRIC BYPASS  2003    HX KNEE REPLACEMENT Bilateral 2015    HX ORTHOPAEDIC Right     HX ORTHOPAEDIC Bilateral     bunionectomy    HX OTHER SURGICAL Bilateral     varicose vein stipping    HX OTHER SURGICAL      colonoscopy    HX ROTATOR CUFF REPAIR Right 2014    HX TUBAL LIGATION         Family History:  Family History   Problem Relation Age of Onset    Dementia Father        Social History:  Social History     Tobacco Use    Smoking status: Former     Years: 0.50     Types: Cigarettes     Quit date:      Years since quittin.7    Smokeless tobacco: Never   Vaping Use    Vaping Use: Never used   Substance Use Topics    Alcohol use: Yes     Alcohol/week: 5.0 standard drinks     Types: 5 Glasses of wine per week     Comment: socially on weekends    Drug use: Not Currently     Types: Marijuana       Allergies: Allergies   Allergen Reactions    Latex Rash    Adhesive Tape-Silicones Rash     Patient reports she can tolerate paper tape    Bactrim [Sulfamethoprim Ds] Itching    Codeine Palpitations    Morphine Itching       Review of Systems   Review of Systems   Constitutional:  Negative for chills and fever. Respiratory:  Negative for shortness of breath. Cardiovascular:  Negative for chest pain. Gastrointestinal:  Negative for abdominal pain, diarrhea, nausea and vomiting. Musculoskeletal:  Negative for back pain. Skin:  Positive for color change and wound. Neurological:  Negative for dizziness, weakness and numbness. All other systems reviewed and are negative. Physical Exam     Vitals:    10/08/22 1941   BP: (!) 167/45   Pulse: 83   Resp: 16   Temp: 97.6 °F (36.4 °C)   SpO2: 100%   Weight: 130.6 kg (288 lb)   Height: 5' 3\" (1.6 m)     Physical Exam  Vitals and nursing note reviewed. Constitutional:       Appearance: She is well-developed.    HENT: Head: Normocephalic and atraumatic. Cardiovascular:      Rate and Rhythm: Normal rate and regular rhythm. Heart sounds: Normal heart sounds. No murmur heard. Pulmonary:      Effort: Pulmonary effort is normal. No respiratory distress. Breath sounds: Normal breath sounds. No wheezing or rales. Abdominal:      General: Bowel sounds are normal.      Palpations: Abdomen is soft. Tenderness: There is no abdominal tenderness. Comments: No tenderness with the palpation of the abdomen   Musculoskeletal:      Cervical back: Normal range of motion and neck supple. Neurological:      Mental Status: She is alert and oriented to person, place, and time. Psychiatric:         Judgment: Judgment normal.       Diagnostic Study Results     Labs:     Recent Results (from the past 12 hour(s))   LACTIC ACID    Collection Time: 10/08/22  8:45 PM   Result Value Ref Range    Lactic acid 0.8 0.4 - 2.0 MMOL/L   CBC WITH AUTOMATED DIFF    Collection Time: 10/08/22  8:46 PM   Result Value Ref Range    WBC 7.6 4.6 - 13.2 K/uL    RBC 4.11 (L) 4.20 - 5.30 M/uL    HGB 11.4 (L) 12.0 - 16.0 g/dL    HCT 36.8 35.0 - 45.0 %    MCV 89.5 78.0 - 100.0 FL    MCH 27.7 24.0 - 34.0 PG    MCHC 31.0 31.0 - 37.0 g/dL    RDW 13.6 11.6 - 14.5 %    PLATELET 410 451 - 014 K/uL    MPV 9.4 9.2 - 11.8 FL    NRBC 0.0 0  WBC    ABSOLUTE NRBC 0.00 0.00 - 0.01 K/uL    NEUTROPHILS 67 40 - 73 %    LYMPHOCYTES 21 21 - 52 %    MONOCYTES 7 3 - 10 %    EOSINOPHILS 5 0 - 5 %    BASOPHILS 1 0 - 2 %    IMMATURE GRANULOCYTES 0 0.0 - 0.5 %    ABS. NEUTROPHILS 5.1 1.8 - 8.0 K/UL    ABS. LYMPHOCYTES 1.6 0.9 - 3.6 K/UL    ABS. MONOCYTES 0.6 0.05 - 1.2 K/UL    ABS. EOSINOPHILS 0.4 0.0 - 0.4 K/UL    ABS. BASOPHILS 0.0 0.0 - 0.1 K/UL    ABS. IMM.  GRANS. 0.0 0.00 - 0.04 K/UL    DF AUTOMATED     METABOLIC PANEL, COMPREHENSIVE    Collection Time: 10/08/22  8:46 PM   Result Value Ref Range    Sodium 138 136 - 145 mmol/L    Potassium 4.1 3.5 - 5.5 mmol/L    Chloride 104 100 - 111 mmol/L    CO2 28 21 - 32 mmol/L    Anion gap 6 3.0 - 18 mmol/L    Glucose 94 74 - 99 mg/dL    BUN 16 7.0 - 18 MG/DL    Creatinine 1.03 0.6 - 1.3 MG/DL    BUN/Creatinine ratio 16 12 - 20      eGFR >60 >60 ml/min/1.73m2    Calcium 9.1 8.5 - 10.1 MG/DL    Bilirubin, total 0.3 0.2 - 1.0 MG/DL    ALT (SGPT) 20 13 - 56 U/L    AST (SGOT) 14 10 - 38 U/L    Alk. phosphatase 108 45 - 117 U/L    Protein, total 7.3 6.4 - 8.2 g/dL    Albumin 3.0 (L) 3.4 - 5.0 g/dL    Globulin 4.3 (H) 2.0 - 4.0 g/dL    A-G Ratio 0.7 (L) 0.8 - 1.7     LIPASE    Collection Time: 10/08/22  8:46 PM   Result Value Ref Range    Lipase 144 73 - 393 U/L   CK    Collection Time: 10/08/22  8:46 PM   Result Value Ref Range     26 - 192 U/L   URINALYSIS W/ RFLX MICROSCOPIC    Collection Time: 10/08/22 10:20 PM   Result Value Ref Range    Color YELLOW      Appearance CLEAR      Specific gravity 1.017 1.005 - 1.030      pH (UA) 6.0 5.0 - 8.0      Protein Negative NEG mg/dL    Glucose Negative NEG mg/dL    Ketone Negative NEG mg/dL    Bilirubin Negative NEG      Blood Negative NEG      Urobilinogen 1.0 0.2 - 1.0 EU/dL    Nitrites Negative NEG      Leukocyte Esterase Negative NEG         Radiologic Studies:   CT ABD PELV W CONT   Final Result      1. Ventral abdominal to pelvic hernia mesh repair with residual soft tissue 4.5   x 2 x 10 cm localized partial gas containing fluid with moderate adjacent   stranding extending to the dermal surface. Diagnostic considerations would   include phlegmon/developing abscess or postsurgical seroma/hematoma. 2. Ventral upper abdominal omental stranding, postsurgical versus reactive. No   intra-abdominal or intrapelvic localized fluid collection or obstructive   process. CT Results  (Last 48 hours)                 10/08/22 2217  CT ABD PELV W CONT Final result    Impression:      1.   Ventral abdominal to pelvic hernia mesh repair with residual soft tissue 4.5   x 2 x 10 cm localized partial gas containing fluid with moderate adjacent   stranding extending to the dermal surface. Diagnostic considerations would   include phlegmon/developing abscess or postsurgical seroma/hematoma. 2. Ventral upper abdominal omental stranding, postsurgical versus reactive. No   intra-abdominal or intrapelvic localized fluid collection or obstructive   process. Narrative:  EXAM: CT ABD PELV W CONT       CLINICAL INDICATION/HISTORY: abscess post op infection   -Additional: None       COMPARISON: None       TECHNIQUE: Axial CT imaging of the abdomen and pelvis was performed with   intravenous contrast. Multiplanar reformats were generated. One or more dose   reduction techniques were used on this CT: automated exposure control,   adjustment of the mAs and/or kVp according to patient size, and iterative   reconstruction techniques. The specific techniques used on this CT exam have   been documented in the patient's electronic medical record. Digital Imaging and   Communications in Medicine (DICOM) format image data are available to   nonaffiliated external healthcare facilities or entities on a secure, media   free, reciprocally searchable basis with patient authorization for at least a   12-month period after this study. _______________       FINDINGS:       LOWER CHEST: Unremarkable. LIVER, BILIARY: No acute or suspicious hepatic parenchymal abnormality. No   biliary dilation. Cholecystectomy       PANCREAS: Normal.       SPLEEN: Normal.       ADRENALS: Normal.       KIDNEYS, URETERS, BLADDER: Isoenhancing bilateral kidneys without   hydronephrosis, perinephric fluid or induration. No suspicious enhancing renal   mass or lesion. Unremarkable bladder       PELVIC ORGANS: Unremarkable. GASTROINTESTINAL TRACT: Prior bariatric surgical changes, without duodenal   dilatation. No findings of large or small bowel obstruction.  Colonic   diverticulosis, no CT evidence of acute diverticulitis. LYMPH NODES: No enlarged lymph nodes. VASCULATURE: Unremarkable. OSSEOUS: No acute or aggressive osseous abnormalities identified. OTHER: Ventral upper to infraumbilical abdominal wall hernia repair, with   localized ventral mid abdominal omental stranding. No discrete intrahepatic   abdominal localized fluid collection. There is a ventral abdominal superficial   ill-defined partial gas containing fluid collection measuring approximately 4.5   x 2 x 10 cm (axial 50/sagittal 83) with moderate adjacent stranding. Ventral   abdominal to upper pelvic dermal thickening is present. Chronic deep fascial   linear calcific densities are present.               _______________                 CXR Results  (Last 48 hours)      None            Medical Decision Making   I am the first provider for this patient. I reviewed the vital signs, available nursing notes, past medical history, past surgical history, family history and social history. Vital Signs: Reviewed the patient's vital signs. Pulse Oximetry Analysis: 100% on RA       Records Reviewed: Nursing Notes and Old Medical Records    Procedures:  Procedures    ED Course:   8:15 PM Initial assessment performed. The patients presenting problems have been discussed, and they are in agreement with the care plan formulated and outlined with them. I have encouraged them to ask questions as they arise throughout their visit. Case discussed with Dr. Beulah Maciel, who spoke with Julselene Friends, no evidence of sepsis or spreading cellulitis will have patient continue her Augmentin cultures pending patient stable for discharge with close outpatient follow-up    See face to face note   FACE-TO-FACE PROGRESS NOTE:  11:45 PM  The patient presents with worsening pain discomfort. Patient had prior abdominal surgery by Dr. Emilie Oconnor. She had a drain removed from her abdomen. Recently now has swelling.   Since having her drain pulled she has continuous drainage from her abdomen when standing or upright. She denies nausea vomiting. When lying flat she essentially has no symptoms. My exam shows well-appearing nontoxic morbidly obese female. She has midline surgical incision with trace erythema to the sides. There is a single punctate hole to the abdomen with no drainage. When area was palpated she has minor amount of drainage. There is no tenderness down with deeper palpation. .  Imp/plan: Patient with postoperative surgical fluid collection hematoma or seroma possible infectious material located adjacent to the abdomen. Patient was reviewed with surgeon on-call Dr. Jamal Buchanan. At this point she shows no signs of being sepsis or toxic. She may have a abdomen which requires reexploration opening with antibiotic powder was placed to the abdomen. There is no indication for immediate surgery. We will continue with pain control antibiotics will follow with Dr. Deanna Almonte early this week. .   I personally saw and performed a substantive portion of the visit including the medical decision making and have personally seen and examined the patient, reviewed the ROHITH's note and agree with findings and plan. Written by Viviane Henry MD    CONSULT NOTE:   12:02 AM  Viviane Henry MD   spoke with Dr. Jamal Buchanan  Specialty: General surgery  Discussed pt's hx, disposition, and available diagnostic and imaging results  over the telephone. Reviewed care plans. Consulting physician agrees with plans as outlined. Patient with continued drainage after having her surgical drain removed. She does not show evidence of gross cellulitis. Fluid collection remains within the abdomen. White count normal without left shift. We will continue with current treatment follow-up with Dr. Deanna Almonte next week. .          Discussion:  Pt presents with drainage and erythema from surgical site. Afebrile no leukocytosis all other labs within normal limits. Wound culture obtained. CT does show abscess, have patient continue the Augmentin and follow-up in the office early next week strict return precautions if any vomiting spiking fever increased pain or any other new or worsening symptoms return to the ED immediately. Strict return precautions given, pt offering no questions or complaints. Diagnosis and Disposition     DISCHARGE NOTE:  Johnathon Houston's  results have been reviewed with her. She has been counseled regarding her diagnosis, treatment, and plan. She verbally conveys understanding and agreement of the signs, symptoms, diagnosis, treatment and prognosis and additionally agrees to follow up as discussed. She also agrees with the care-plan and conveys that all of her questions have been answered. I have also provided discharge instructions for her that include: educational information regarding their diagnosis and treatment, and list of reasons why they would want to return to the ED prior to their follow-up appointment, should her condition change. She has been provided with education for proper emergency department utilization. CLINICAL IMPRESSION:    1. Abscess of skin of abdomen        PLAN:  1. D/C Home  2. Discharge Medication List as of 10/9/2022  1:33 AM        3. Follow-up Information       Follow up With Specialties Details Why Contact Info    Maxwell Ramos MD Family Medicine Schedule an appointment as soon as possible for a visit   [address] [place] [address] E      [facility] EMERGENCY DEPT Emergency Medicine  If symptoms worsen [address] [address]  [phone]                   Please note that this dictation was completed with [employer], the computer voice recognition software. Quite often unanticipated grammatical, syntax, homophones, and other interpretive errors are inadvertently transcribed by the computer software. Please disregard these errors.   Please excuse any errors that have escaped final proofreading.

## 2022-10-11 ENCOUNTER — HOSPITAL ENCOUNTER (OUTPATIENT)
Dept: PREADMISSION TESTING | Age: 62
Discharge: HOME | End: 2022-10-11

## 2022-10-11 VITALS — BODY MASS INDEX: 51.91 KG/M2 | HEIGHT: 63 IN | WEIGHT: 293 LBS

## 2022-10-11 LAB
BACTERIA SPEC CULT: ABNORMAL
GRAM STN SPEC: ABNORMAL
SERVICE CMNT-IMP: ABNORMAL

## 2022-10-11 RX ORDER — SODIUM CHLORIDE, SODIUM LACTATE, POTASSIUM CHLORIDE, CALCIUM CHLORIDE 600; 310; 30; 20 MG/100ML; MG/100ML; MG/100ML; MG/100ML
125 INJECTION, SOLUTION INTRAVENOUS CONTINUOUS
Status: CANCELLED | OUTPATIENT
Start: 2022-10-11

## 2022-10-11 RX ORDER — DOXYCYCLINE 50 MG/1
50 TABLET ORAL 2 TIMES DAILY
COMMUNITY

## 2022-10-12 ENCOUNTER — ANESTHESIA EVENT (OUTPATIENT)
Dept: SURGERY | Age: 62
End: 2022-10-12
Payer: COMMERCIAL

## 2022-10-12 ENCOUNTER — HOSPITAL ENCOUNTER (OUTPATIENT)
Age: 62
Setting detail: OBSERVATION
Discharge: HOME HEALTH | End: 2022-10-14
Attending: SURGERY | Admitting: SURGERY
Payer: COMMERCIAL

## 2022-10-12 ENCOUNTER — ANESTHESIA (OUTPATIENT)
Dept: SURGERY | Age: 62
End: 2022-10-12
Payer: COMMERCIAL

## 2022-10-12 PROBLEM — T81.42XA DEEP POSTOPERATIVE WOUND INFECTION: Status: ACTIVE | Noted: 2022-10-12

## 2022-10-12 LAB
ANION GAP SERPL CALC-SCNC: 5 MMOL/L (ref 3–18)
BUN SERPL-MCNC: 15 MG/DL (ref 7–18)
BUN/CREAT SERPL: 15 (ref 12–20)
CALCIUM SERPL-MCNC: 9.4 MG/DL (ref 8.5–10.1)
CHLORIDE SERPL-SCNC: 106 MMOL/L (ref 100–111)
CO2 SERPL-SCNC: 30 MMOL/L (ref 21–32)
CREAT SERPL-MCNC: 0.97 MG/DL (ref 0.6–1.3)
GLUCOSE SERPL-MCNC: 107 MG/DL (ref 74–99)
POTASSIUM SERPL-SCNC: 3.9 MMOL/L (ref 3.5–5.5)
SODIUM SERPL-SCNC: 141 MMOL/L (ref 136–145)

## 2022-10-12 PROCEDURE — 77030002933 HC SUT MCRYL J&J -A: Performed by: SURGERY

## 2022-10-12 PROCEDURE — 36415 COLL VENOUS BLD VENIPUNCTURE: CPT

## 2022-10-12 PROCEDURE — 87075 CULTR BACTERIA EXCEPT BLOOD: CPT

## 2022-10-12 PROCEDURE — 2709999900 HC NON-CHARGEABLE SUPPLY: Performed by: SURGERY

## 2022-10-12 PROCEDURE — 74011250637 HC RX REV CODE- 250/637: Performed by: SURGERY

## 2022-10-12 PROCEDURE — 77030020782 HC GWN BAIR PAWS FLX 3M -B: Performed by: SURGERY

## 2022-10-12 PROCEDURE — 77010033678 HC OXYGEN DAILY

## 2022-10-12 PROCEDURE — 76010000138 HC OR TIME 0.5 TO 1 HR: Performed by: SURGERY

## 2022-10-12 PROCEDURE — 74011250636 HC RX REV CODE- 250/636: Performed by: NURSE ANESTHETIST, CERTIFIED REGISTERED

## 2022-10-12 PROCEDURE — 77030040361 HC SLV COMPR DVT MDII -B: Performed by: SURGERY

## 2022-10-12 PROCEDURE — 87186 SC STD MICRODIL/AGAR DIL: CPT

## 2022-10-12 PROCEDURE — G0378 HOSPITAL OBSERVATION PER HR: HCPCS

## 2022-10-12 PROCEDURE — 76060000032 HC ANESTHESIA 0.5 TO 1 HR: Performed by: SURGERY

## 2022-10-12 PROCEDURE — 74011250636 HC RX REV CODE- 250/636: Performed by: SURGERY

## 2022-10-12 PROCEDURE — 76210000006 HC OR PH I REC 0.5 TO 1 HR: Performed by: SURGERY

## 2022-10-12 PROCEDURE — 87077 CULTURE AEROBIC IDENTIFY: CPT

## 2022-10-12 PROCEDURE — 77030031139 HC SUT VCRL2 J&J -A: Performed by: SURGERY

## 2022-10-12 PROCEDURE — 87205 SMEAR GRAM STAIN: CPT

## 2022-10-12 PROCEDURE — 74011000250 HC RX REV CODE- 250: Performed by: SURGERY

## 2022-10-12 PROCEDURE — 74011000250 HC RX REV CODE- 250: Performed by: NURSE ANESTHETIST, CERTIFIED REGISTERED

## 2022-10-12 PROCEDURE — 80048 BASIC METABOLIC PNL TOTAL CA: CPT

## 2022-10-12 RX ORDER — SPIRONOLACTONE 25 MG/1
25 TABLET ORAL DAILY
Status: DISCONTINUED | OUTPATIENT
Start: 2022-10-13 | End: 2022-10-14 | Stop reason: HOSPADM

## 2022-10-12 RX ORDER — LOSARTAN POTASSIUM 50 MG/1
100 TABLET ORAL DAILY
Status: DISCONTINUED | OUTPATIENT
Start: 2022-10-13 | End: 2022-10-14 | Stop reason: HOSPADM

## 2022-10-12 RX ORDER — ONDANSETRON 2 MG/ML
4 INJECTION INTRAMUSCULAR; INTRAVENOUS ONCE
Status: DISCONTINUED | OUTPATIENT
Start: 2022-10-12 | End: 2022-10-12 | Stop reason: HOSPADM

## 2022-10-12 RX ORDER — SODIUM CHLORIDE, SODIUM LACTATE, POTASSIUM CHLORIDE, CALCIUM CHLORIDE 600; 310; 30; 20 MG/100ML; MG/100ML; MG/100ML; MG/100ML
125 INJECTION, SOLUTION INTRAVENOUS CONTINUOUS
Status: DISCONTINUED | OUTPATIENT
Start: 2022-10-12 | End: 2022-10-12

## 2022-10-12 RX ORDER — OXYCODONE AND ACETAMINOPHEN 5; 325 MG/1; MG/1
1 TABLET ORAL
Status: DISCONTINUED | OUTPATIENT
Start: 2022-10-12 | End: 2022-10-14 | Stop reason: HOSPADM

## 2022-10-12 RX ORDER — HYDROMORPHONE HYDROCHLORIDE 1 MG/ML
0.5 INJECTION, SOLUTION INTRAMUSCULAR; INTRAVENOUS; SUBCUTANEOUS AS NEEDED
Status: DISCONTINUED | OUTPATIENT
Start: 2022-10-12 | End: 2022-10-12 | Stop reason: HOSPADM

## 2022-10-12 RX ORDER — HYDROCHLOROTHIAZIDE 25 MG/1
25 TABLET ORAL DAILY
Status: DISCONTINUED | OUTPATIENT
Start: 2022-10-13 | End: 2022-10-14 | Stop reason: HOSPADM

## 2022-10-12 RX ORDER — NALOXONE HYDROCHLORIDE 0.4 MG/ML
0.04 INJECTION, SOLUTION INTRAMUSCULAR; INTRAVENOUS; SUBCUTANEOUS
Status: DISCONTINUED | OUTPATIENT
Start: 2022-10-12 | End: 2022-10-12 | Stop reason: HOSPADM

## 2022-10-12 RX ORDER — VANCOMYCIN 2 GRAM/500 ML IN 0.9 % SODIUM CHLORIDE INTRAVENOUS
2000 ONCE
Status: COMPLETED | OUTPATIENT
Start: 2022-10-12 | End: 2022-10-12

## 2022-10-12 RX ORDER — SODIUM CHLORIDE 0.9 % (FLUSH) 0.9 %
5-40 SYRINGE (ML) INJECTION EVERY 8 HOURS
Status: DISCONTINUED | OUTPATIENT
Start: 2022-10-12 | End: 2022-10-12 | Stop reason: HOSPADM

## 2022-10-12 RX ORDER — PROPOFOL 10 MG/ML
INJECTION, EMULSION INTRAVENOUS
Status: DISCONTINUED | OUTPATIENT
Start: 2022-10-12 | End: 2022-10-12 | Stop reason: HOSPADM

## 2022-10-12 RX ORDER — DIPHENHYDRAMINE HYDROCHLORIDE 50 MG/ML
12.5 INJECTION, SOLUTION INTRAMUSCULAR; INTRAVENOUS
Status: DISCONTINUED | OUTPATIENT
Start: 2022-10-12 | End: 2022-10-14 | Stop reason: HOSPADM

## 2022-10-12 RX ORDER — SODIUM CHLORIDE 0.9 % (FLUSH) 0.9 %
5-40 SYRINGE (ML) INJECTION AS NEEDED
Status: DISCONTINUED | OUTPATIENT
Start: 2022-10-12 | End: 2022-10-12 | Stop reason: HOSPADM

## 2022-10-12 RX ORDER — PROPOFOL 10 MG/ML
INJECTION, EMULSION INTRAVENOUS AS NEEDED
Status: DISCONTINUED | OUTPATIENT
Start: 2022-10-12 | End: 2022-10-12 | Stop reason: HOSPADM

## 2022-10-12 RX ORDER — BUDESONIDE 0.25 MG/2ML
250 INHALANT ORAL
Status: DISCONTINUED | OUTPATIENT
Start: 2022-10-12 | End: 2022-10-14 | Stop reason: HOSPADM

## 2022-10-12 RX ORDER — NALBUPHINE HYDROCHLORIDE 10 MG/ML
5 INJECTION, SOLUTION INTRAMUSCULAR; INTRAVENOUS; SUBCUTANEOUS
Status: DISCONTINUED | OUTPATIENT
Start: 2022-10-12 | End: 2022-10-12 | Stop reason: HOSPADM

## 2022-10-12 RX ORDER — FENTANYL CITRATE 50 UG/ML
INJECTION, SOLUTION INTRAMUSCULAR; INTRAVENOUS AS NEEDED
Status: DISCONTINUED | OUTPATIENT
Start: 2022-10-12 | End: 2022-10-12 | Stop reason: HOSPADM

## 2022-10-12 RX ORDER — ENOXAPARIN SODIUM 100 MG/ML
40 INJECTION SUBCUTANEOUS EVERY 24 HOURS
Status: DISCONTINUED | OUTPATIENT
Start: 2022-10-13 | End: 2022-10-14 | Stop reason: HOSPADM

## 2022-10-12 RX ORDER — ROSUVASTATIN CALCIUM 10 MG/1
5 TABLET, COATED ORAL
Status: DISCONTINUED | OUTPATIENT
Start: 2022-10-12 | End: 2022-10-14 | Stop reason: HOSPADM

## 2022-10-12 RX ORDER — LIDOCAINE HYDROCHLORIDE 20 MG/ML
INJECTION, SOLUTION EPIDURAL; INFILTRATION; INTRACAUDAL; PERINEURAL AS NEEDED
Status: DISCONTINUED | OUTPATIENT
Start: 2022-10-12 | End: 2022-10-12 | Stop reason: HOSPADM

## 2022-10-12 RX ORDER — SODIUM CHLORIDE 0.9 % (FLUSH) 0.9 %
5-40 SYRINGE (ML) INJECTION AS NEEDED
Status: DISCONTINUED | OUTPATIENT
Start: 2022-10-12 | End: 2022-10-14 | Stop reason: HOSPADM

## 2022-10-12 RX ORDER — DIPHENHYDRAMINE HYDROCHLORIDE 50 MG/ML
12.5 INJECTION, SOLUTION INTRAMUSCULAR; INTRAVENOUS
Status: DISCONTINUED | OUTPATIENT
Start: 2022-10-12 | End: 2022-10-12 | Stop reason: HOSPADM

## 2022-10-12 RX ORDER — DEXMEDETOMIDINE HYDROCHLORIDE 100 UG/ML
INJECTION, SOLUTION INTRAVENOUS AS NEEDED
Status: DISCONTINUED | OUTPATIENT
Start: 2022-10-12 | End: 2022-10-12 | Stop reason: HOSPADM

## 2022-10-12 RX ORDER — SODIUM CHLORIDE, SODIUM LACTATE, POTASSIUM CHLORIDE, CALCIUM CHLORIDE 600; 310; 30; 20 MG/100ML; MG/100ML; MG/100ML; MG/100ML
50 INJECTION, SOLUTION INTRAVENOUS CONTINUOUS
Status: DISCONTINUED | OUTPATIENT
Start: 2022-10-12 | End: 2022-10-12 | Stop reason: HOSPADM

## 2022-10-12 RX ORDER — DOCUSATE SODIUM 100 MG/1
100 CAPSULE, LIQUID FILLED ORAL 2 TIMES DAILY
Status: DISCONTINUED | OUTPATIENT
Start: 2022-10-12 | End: 2022-10-14 | Stop reason: HOSPADM

## 2022-10-12 RX ORDER — ACETAMINOPHEN 325 MG/1
650 TABLET ORAL
Status: DISCONTINUED | OUTPATIENT
Start: 2022-10-12 | End: 2022-10-14 | Stop reason: HOSPADM

## 2022-10-12 RX ORDER — ALBUTEROL SULFATE 0.83 MG/ML
2.5 SOLUTION RESPIRATORY (INHALATION)
Status: DISCONTINUED | OUTPATIENT
Start: 2022-10-12 | End: 2022-10-12 | Stop reason: HOSPADM

## 2022-10-12 RX ORDER — FENTANYL CITRATE 50 UG/ML
50 INJECTION, SOLUTION INTRAMUSCULAR; INTRAVENOUS
Status: DISCONTINUED | OUTPATIENT
Start: 2022-10-12 | End: 2022-10-12 | Stop reason: HOSPADM

## 2022-10-12 RX ORDER — SODIUM CHLORIDE 0.9 % (FLUSH) 0.9 %
5-40 SYRINGE (ML) INJECTION EVERY 8 HOURS
Status: DISCONTINUED | OUTPATIENT
Start: 2022-10-12 | End: 2022-10-14 | Stop reason: HOSPADM

## 2022-10-12 RX ORDER — ONDANSETRON 2 MG/ML
4 INJECTION INTRAMUSCULAR; INTRAVENOUS
Status: DISCONTINUED | OUTPATIENT
Start: 2022-10-12 | End: 2022-10-14 | Stop reason: HOSPADM

## 2022-10-12 RX ORDER — MIDAZOLAM HYDROCHLORIDE 1 MG/ML
INJECTION, SOLUTION INTRAMUSCULAR; INTRAVENOUS AS NEEDED
Status: DISCONTINUED | OUTPATIENT
Start: 2022-10-12 | End: 2022-10-12 | Stop reason: HOSPADM

## 2022-10-12 RX ORDER — METOPROLOL SUCCINATE 25 MG/1
25 TABLET, EXTENDED RELEASE ORAL DAILY
Status: DISCONTINUED | OUTPATIENT
Start: 2022-10-13 | End: 2022-10-14 | Stop reason: HOSPADM

## 2022-10-12 RX ORDER — ONDANSETRON 2 MG/ML
INJECTION INTRAMUSCULAR; INTRAVENOUS AS NEEDED
Status: DISCONTINUED | OUTPATIENT
Start: 2022-10-12 | End: 2022-10-12 | Stop reason: HOSPADM

## 2022-10-12 RX ORDER — PANTOPRAZOLE SODIUM 40 MG/1
40 TABLET, DELAYED RELEASE ORAL
Status: DISCONTINUED | OUTPATIENT
Start: 2022-10-13 | End: 2022-10-14 | Stop reason: HOSPADM

## 2022-10-12 RX ORDER — ALBUTEROL SULFATE 0.83 MG/ML
2.5 SOLUTION RESPIRATORY (INHALATION)
Status: DISCONTINUED | OUTPATIENT
Start: 2022-10-12 | End: 2022-10-14 | Stop reason: HOSPADM

## 2022-10-12 RX ADMIN — ROSUVASTATIN CALCIUM 5 MG: 10 TABLET, COATED ORAL at 21:49

## 2022-10-12 RX ADMIN — MIDAZOLAM 2 MG: 1 INJECTION INTRAMUSCULAR; INTRAVENOUS at 16:08

## 2022-10-12 RX ADMIN — PROPOFOL 20 MG: 10 INJECTION, EMULSION INTRAVENOUS at 16:20

## 2022-10-12 RX ADMIN — FENTANYL CITRATE 50 MCG: 50 INJECTION, SOLUTION INTRAMUSCULAR; INTRAVENOUS at 16:11

## 2022-10-12 RX ADMIN — PROPOFOL 40 MG: 10 INJECTION, EMULSION INTRAVENOUS at 16:32

## 2022-10-12 RX ADMIN — DOCUSATE SODIUM 100 MG: 100 CAPSULE ORAL at 21:49

## 2022-10-12 RX ADMIN — PROPOFOL 20 MG: 10 INJECTION, EMULSION INTRAVENOUS at 16:23

## 2022-10-12 RX ADMIN — PROPOFOL 75 MCG/KG/MIN: 10 INJECTION, EMULSION INTRAVENOUS at 16:32

## 2022-10-12 RX ADMIN — SODIUM CHLORIDE, SODIUM LACTATE, POTASSIUM CHLORIDE, AND CALCIUM CHLORIDE 125 ML/HR: 600; 310; 30; 20 INJECTION, SOLUTION INTRAVENOUS at 14:23

## 2022-10-12 RX ADMIN — DEXMEDETOMIDINE HYDROCHLORIDE 4 MCG: 100 INJECTION, SOLUTION INTRAVENOUS at 16:25

## 2022-10-12 RX ADMIN — PROPOFOL 20 MG: 10 INJECTION, EMULSION INTRAVENOUS at 16:16

## 2022-10-12 RX ADMIN — DEXMEDETOMIDINE HYDROCHLORIDE 4 MCG: 100 INJECTION, SOLUTION INTRAVENOUS at 16:20

## 2022-10-12 RX ADMIN — FENTANYL CITRATE 50 MCG: 50 INJECTION, SOLUTION INTRAMUSCULAR; INTRAVENOUS at 16:32

## 2022-10-12 RX ADMIN — ONDANSETRON HYDROCHLORIDE 4 MG: 2 INJECTION INTRAMUSCULAR; INTRAVENOUS at 16:37

## 2022-10-12 RX ADMIN — OXYCODONE HYDROCHLORIDE AND ACETAMINOPHEN 1 TABLET: 5; 325 TABLET ORAL at 20:21

## 2022-10-12 RX ADMIN — VANCOMYCIN HYDROCHLORIDE 2000 MG: 10 INJECTION, POWDER, LYOPHILIZED, FOR SOLUTION INTRAVENOUS at 15:17

## 2022-10-12 RX ADMIN — LIDOCAINE HYDROCHLORIDE 60 MG: 20 INJECTION, SOLUTION EPIDURAL; INFILTRATION; INTRACAUDAL; PERINEURAL at 16:16

## 2022-10-12 NOTE — BRIEF OP NOTE
Brief Postoperative Note    Patient: Misha Dumont  YOB: 1960  MRN: [id number]    Date of Procedure: 10/12/2022     Pre-Op Diagnosis: ABSCESS ABDOMINAL WALL    Post-Op Diagnosis: Same as preoperative diagnosis. Procedure(s):  INCISION AND DRAINAGE ABDOMINAL WALL ABSCESS    Surgeon(s):   Kp Bailey MD    Surgical Assistant: Surg Asst-1: Sunita Rincon    Anesthesia: MAC     Estimated Blood Loss (mL): Minimal    Complications: None    Specimens:   ID Type Source Tests Collected by Time Destination   1 : ABDOMINAL WALL ABSCESS Wound Abdomen CULTURE, ANAEROBIC, CULTURE, WOUND W Lady Renae, Mirna Freeman MD 10/12/2022 1629 Microbiology        Implants: * No implants in log *    Drains: * No LDAs found *    Findings: abscess/seroma    Electronically Signed by Veda Banegas MD on 10/12/2022 at 4:46 PM

## 2022-10-12 NOTE — H&P
This 65year old female presents for abdominal wound. History of Present Illness  1. Abdominal wound   Patient returns for wound assessment. Continues to drain moderate amount of serosanguinous fluid. Erythematous ring around wound present. Mild induration noted. Area is painful at times. She reports chills and fever over the weekend resulting in ED visit. Lab work, CT scan and cultures performed. Today she is actually feeling better with no lingering fevers or chills. Problem List  Problem List reviewed. Past Medical/Surgical History   (Detailed)  Disease/disorder  Onset Date  Management  Date  Comments      double hernia surgery          Gastric bypass          2 knee replacements          2 c sections          foot surgery          Bilateral tailor's bunion of feet          Complete rotator cuff tear          gallbladder surgery          Abdominoplasty      Arthritis          Asthma          hyperlipidemia          Hypertension              Allergies  Ingredient  Reaction (Severity)  Medication Name  Comment  CODEINE        LATEX        SULFA (SULFONAMIDE ANTIBIOTICS)        Reviewed, updated. Family History   (Detailed)    Relationship  Family Member Name    Age at Death  Condition  Onset Age  Cause of Death          Family history of hyperlipidemia    N          Family history of Diabetes mellitus    N          Family history of heart problems    N          Family history of renal problems    N      Social History  (Detailed)  Tobacco use reviewed. Preferred language is [place]. Marital Status/Family/Social Support  Marital status:     Tobacco use status: Current non-smoker. Smoking status: Never smoker. Tobacco Screening  Patient has never used tobacco. Patient has not used tobacco in the last 30 days. Patient has not used smokeless tobacco in the last 30 days.     Smoking Status  Type  Smoking Status  Usage Per Day  Years Used  Pack Years  Total Pack Years    Never smoker            Alcohol  There is a history of alcohol use. consumed occasionally. Caffeine  The patient uses caffeine: coffee. Review of Systems  System  Neg/Pos  Details  Constitutional  Negative  Fever, Night sweats and Weight loss. ENMT  Negative  Hearing loss, Tinnitus, Vertigo and Voice change. Eyes  Negative  Diplopia and Vision loss. Respiratory  Negative  Asthma, Cough, Dyspnea, Hemoptysis, Known TB exposure and Wheezing. Cardio  Negative  Chest pain, Claudication, Edema, Irregular heartbeat/palpitations and Thrombophlebitis. GI  Negative  Bloating, Dysphagia, Hemorrhoids, Jaundice and Reflux.   Negative  Dysuria, Nocturia, Passage stone/gravel and Urinary incontinence. Endocrine  Negative  Cold intolerance and Goiter. Neuro  Negative  Focal weakness, Headache, Paresthesia, Seizures and Syncope. Integumentary  Negative  Change in shape/size of mole(s) and Skin lesion. MS  Negative  Back pain, Bone/joint symptoms and Muscle weakness. Hema/Lymph  Negative  Easy bleeding and Easy bruising. Allergic/Immuno  Negative  Contact allergy and Contact dermatitis.         Vital Signs  Height  Time  ft  in  cm  Last Measured  Height Position  10:08 AM  5.0  3.00  160.02  10/11/2022  Standing    Weight/BSA/BMI  Time  lb  oz  kg  Context  BMI kg/m2  BSA m2  10:08 AM  294.00    133.356  dressed with shoes  52.08      Blood Pressure  Time  BP mm/Hg  Position  Side  Site  Method  Cuff Size  10:08 AM  136/72  sitting  left  arm  manual  adult large    Temperature/Pulse/Respiration  Time  Temp F  Temp C  Temp Site  Pulse/min  Pattern  Resp/ min  10:08 AM  98.00  36.67  Temporal  86  regular      Pulse Oximetry/FIO2  Time  Pulse Ox (Rest %)  Pulse Ox (Amb %)  O2 Sat  O2 L/Min  Timing  FiO2 %  L/min  Delivery Method  Finger Probe  10:08 AM  94    RA    Pre-tx        L Index    Pain Scale  Time  Pain Score  Method  10:08 AM  0/10  Numeric Pain Intensity Scale    Measured by  Time  Measured by  10:08 AM  Lien Jones      Physical Exam    Exam  Findings  Details  Constitutional  Normal  Well developed. Eyes  Normal  Conjunctiva - Right: Normal, Left: Normal.  Respiratory  Normal  Effort - Normal.  Abdomen  *  Inspection - surgical scar(s), wound. Abdominal tenderness is present. Abdomen  Normal  Auscultation - Normal.  Musculoskeletal  Normal  Visual overview of all four extremities is normal.  Psychiatric  Normal  Orientation - Oriented to time, place, person & situation. Appropriate mood and affect. Normal insight. Normal judgment. Completed Orders (This Visit)  Order  Details  Reason  Side  Interpretation  Result  Initial Treatment Date  Region  Lifestyle education regarding diet                    Assessment/Plan  #  Detail Type  Description   1. Assessment  Abdominal wall abscess (L02.211). Impression  Confirmed intra-abdominal abscess on CT with moderate amount of drainage noted. No odor detected. Mild abdominal erythema. Also confirmed MRSA from wound cultures. Started Doxycycline last night. Seen with Dr. Katie Hrerera with plan for I&D of abscess tomorrow at [facility]. IV antibiotics to begin after surgery with consult from Infectious disease planned. .    Provider Plan  Abdominal abscess I&D planned for 10/12/2022 with Dr. Katie Herrera at [facility]. 2.  Assessment  Body mass index (BMI) 50-59.9 , adult (Z68.43). Plan Orders  Today's instructions / counseling include(s) Lifestyle education regarding diet. Pain Management Plan  Pain Scale: 0/10. Method: Numeric Pain Intensity Scale. Instruction(s)/Education:  Lifestyle education regarding diet      Medication Reconciliation  Medications reconciled today.       Medications (Started, Naima Geovanny or Renewed this visit)  Start Date  Medication  Directions  PRN Status  PRN Reason  Instruction  Stop Date    acetaminophen 500 mg capsule  take 1 capsule by oral route  every 6 hours as needed as needed  Y          Advair Diskus 250 mcg-50 mcg/dose powder for inhalation  inhale 1 puff by inhalation route 2 times every day in the morning and evening approximately 12 hours apart  N        11/03/2021  albuterol sulfate 2.5 mg/3 mL (0.083 %) solution for nebulization  TAKE 3 ML BY NEBULIZATION 4 TIMES A DAY AS NEEDED FOR WHEEZING OR SHORTNESS OF BREATH  N        11/03/2021  albuterol sulfate HFA 90 mcg/actuation aerosol inhaler    N        10/03/2022  Augmentin 500 mg-125 mg tablet  take 1 tablet by oral route  every 12 hours  N        11/03/2021  benzonatate 100 mg capsule    N        10/10/2022  doxycycline hyclate 100 mg capsule  take 1 capsule by oral route 2 times every day  N        05/22/2022  ergocalciferol (vitamin D2) 1,250 mcg (50,000 unit) capsule    N        07/28/2022  fluconazole 150 mg tablet  TAKE 1 TABLET BY MOUTH DAILY FOR 3 DAYS.   N          hydrochlorothiazide 25 mg tablet  take 1 tablet by oral route  every day  N        06/06/2022  losartan 100 mg tablet  TAKE 1 TABLET BY MOUTH EVERY DAY  N        06/19/2022  metoprolol succinate ER 25 mg tablet,extended release 24 hr    N        09/09/2022  naloxone 4 mg/actuation nasal spray    N          omeprazole 20 mg tablet,delayed release  po daily  N        06/26/2022  rosuvastatin 5 mg tablet    N        09/14/2021  spironolactone 25 mg-hydrochlorothiazide 25 mg tablet    N          vitamin B complex tablet  1 po daily  N              Orders    Instruction(s)/Education  Assessment  Instruction  I55.37  Lifestyle education regarding diet

## 2022-10-12 NOTE — ANESTHESIA PREPROCEDURE EVALUATION
Relevant Problems   RESPIRATORY SYSTEM   (+) Asthma      CARDIOVASCULAR   (+) Hypertension      GASTROINTESTINAL   (+) GERD (gastroesophageal reflux disease)      ENDOCRINE   (+) Arthritis       Anesthetic History     PONV          Review of Systems / Medical History  Patient summary reviewed, nursing notes reviewed and pertinent labs reviewed    Pulmonary        Sleep apnea: No treatment    Asthma : well controlled  Pertinent negatives: No smoker     Neuro/Psych   Within defined limits        Pertinent negatives: No seizures and CVA   Cardiovascular    Hypertension: well controlled            Pertinent negatives: No past MI and CHF  Exercise tolerance: >4 METS     GI/Hepatic/Renal     GERD: well controlled        Pertinent negatives: No hiatal hernia, liver disease and renal disease   Endo/Other        Morbid obesity and arthritis  Pertinent negatives: No diabetes   Other Findings              Physical Exam    Airway  Mallampati: II  TM Distance: > 6 cm  Neck ROM: normal range of motion   Mouth opening: Normal     Cardiovascular               Dental         Pulmonary                 Abdominal  GI exam deferred       Other Findings            Anesthetic Plan    ASA: 3  Anesthesia type: MAC          Induction: Intravenous  Anesthetic plan and risks discussed with: Patient

## 2022-10-12 NOTE — PERIOP NOTES
TRANSFER - OUT REPORT:    Verbal report given to Aracely Marino RN(name) on Trini Riley  being transferred to [address] [place] [address](unit) for routine post - op       Report consisted of patients Situation, Background, Assessment and   Recommendations(SBAR). Information from the following report(s) SBAR, OR Summary, Procedure Summary, Intake/Output, and MAR was reviewed with the receiving nurse. Lines:   Peripheral IV 10/12/22 Anterior;Right Forearm (Active)   Site Assessment Clean, dry, & intact 10/12/22 1721   Phlebitis Assessment 0 10/12/22 1721   Infiltration Assessment 0 10/12/22 1721   Dressing Status Clean, dry, & intact 10/12/22 1721   Dressing Type Tape;Transparent 10/12/22 1721   Hub Color/Line Status Pink; Infusing;Patent 10/12/22 1721        Opportunity for questions and clarification was provided.       Patient transported with:   Registered Nurse

## 2022-10-12 NOTE — PROGRESS NOTES
Bedside and verbal shift change report given by Jen Blair RN (off going nurse) to Hakan Hutchison RN(on coming nurse). Report included the following information SBAR, Kardex, OR Summary, Intake/Output and MAR.

## 2022-10-12 NOTE — PROGRESS NOTES
Protonix 40 mg was therapeutically interchanged for Omeprazole 20 mg per the P&T Committee approved Therapeutic Interchanges Policy.     Enrike Olmedo MS, Pharmacist  10/12/2022 6:32 PM

## 2022-10-12 NOTE — PERIOP NOTES
Reviewed PTA medication list with patient/caregiver and patient/caregiver denies any additional medications. Patient admits to having a responsible adult care for them at home for at least 24 hours after surgery. Patient encouraged to use gown warming system and informed that using said warming gown to regulate body temperature prior to a procedure has been shown to help reduce the risks of blood clots and infection. Patient's pharmacy of choice verified and documented in PTA medication section. Dual skin assessment & fall risk band verification completed with [employer].

## 2022-10-12 NOTE — ANESTHESIA POSTPROCEDURE EVALUATION
Post-Anesthesia Evaluation and Assessment    Cardiovascular Function/Vital Signs  Visit Vitals  BP (!) 121/58   Pulse 78   Temp 36.4 °C (97.6 °F)   Resp 18   Ht 5' 3\" (1.6 m)   Wt 133.3 kg (293 lb 12.8 oz)   SpO2 97%   BMI 52.04 kg/m²       Patient is status post Procedure(s):  INCISION AND DRAINAGE ABDOMINAL WALL ABSCESS. Nausea/Vomiting: Controlled. Postoperative hydration reviewed and adequate. Pain:  Pain Scale 1: FLACC (10/12/22 1650)  Pain Intensity 1: 0 (10/12/22 1650)   Managed. Neurological Status:   Neuro (WDL): Within Defined Limits (10/12/22 1650)   At baseline. Mental Status and Level of Consciousness: Arousable. Pulmonary Status:   O2 Device: None (10/12/22 1650)   Adequate oxygenation and airway patent. Complications related to anesthesia: None    Post-anesthesia assessment completed. No concerns. Patient has met all discharge requirements.     Signed By: Loni Gottlieb CRNA    October 12, 2022

## 2022-10-12 NOTE — CALL BACK NOTE
WC with MRSA, pt on Augmentin per note. 9:00 AM  Called and spoke to pt. She is currently on augmentin and doxy (X 2 days), but has scheduled surgery today with Dr. Parveen Domingo to wash out the wound.

## 2022-10-12 NOTE — PROGRESS NOTES
19:45 Assessment completed. Lungs are clear bilat. Abd dsg remains C/D/I with + BS. Offers 0 c/o nausea. Resting quietly in bed with visitors @ bedside. Voiding per BR w/o difficulty. 22:40 Shift assessment completed. See nsg flow sheet for details. 03:00 Reassessed with 0 changes noted. Abd dsg remains C/D/I with +BS. Resting quietly in bed x for voiding per BR w/o difficulty. 07:10 Bedside and Verbal shift change report given to MABEL Keyes RN (oncoming nurse) by Chris Oconnell RN (offgoing nurse). Report included the following information SBAR.

## 2022-10-13 LAB
ANION GAP SERPL CALC-SCNC: 5 MMOL/L (ref 3–18)
BUN SERPL-MCNC: 15 MG/DL (ref 7–18)
BUN/CREAT SERPL: 15 (ref 12–20)
CALCIUM SERPL-MCNC: 8.5 MG/DL (ref 8.5–10.1)
CHLORIDE SERPL-SCNC: 106 MMOL/L (ref 100–111)
CO2 SERPL-SCNC: 29 MMOL/L (ref 21–32)
CREAT SERPL-MCNC: 0.99 MG/DL (ref 0.6–1.3)
GLUCOSE SERPL-MCNC: 117 MG/DL (ref 74–99)
POTASSIUM SERPL-SCNC: 4.5 MMOL/L (ref 3.5–5.5)
SODIUM SERPL-SCNC: 140 MMOL/L (ref 136–145)

## 2022-10-13 PROCEDURE — 77030019934 HC DRSG VAC ASST KCON -B

## 2022-10-13 PROCEDURE — 74011250636 HC RX REV CODE- 250/636: Performed by: SURGERY

## 2022-10-13 PROCEDURE — G0378 HOSPITAL OBSERVATION PER HR: HCPCS

## 2022-10-13 PROCEDURE — 74011250637 HC RX REV CODE- 250/637: Performed by: SURGERY

## 2022-10-13 PROCEDURE — 74011000250 HC RX REV CODE- 250: Performed by: SURGERY

## 2022-10-13 PROCEDURE — 2709999900 HC NON-CHARGEABLE SUPPLY

## 2022-10-13 PROCEDURE — 36415 COLL VENOUS BLD VENIPUNCTURE: CPT

## 2022-10-13 PROCEDURE — 77030013140 HC MSK NEB VYRM -A

## 2022-10-13 PROCEDURE — 80048 BASIC METABOLIC PNL TOTAL CA: CPT

## 2022-10-13 PROCEDURE — 94640 AIRWAY INHALATION TREATMENT: CPT

## 2022-10-13 PROCEDURE — 97605 NEG PRS WND THER DME<=50SQCM: CPT

## 2022-10-13 RX ADMIN — DOCUSATE SODIUM 100 MG: 100 CAPSULE ORAL at 09:34

## 2022-10-13 RX ADMIN — VANCOMYCIN HYDROCHLORIDE 750 MG: 750 INJECTION, POWDER, LYOPHILIZED, FOR SOLUTION INTRAVENOUS at 14:54

## 2022-10-13 RX ADMIN — BUDESONIDE 250 MCG: 0.25 INHALANT RESPIRATORY (INHALATION) at 19:31

## 2022-10-13 RX ADMIN — PANTOPRAZOLE SODIUM 40 MG: 40 TABLET, DELAYED RELEASE ORAL at 06:44

## 2022-10-13 RX ADMIN — SPIRONOLACTONE 25 MG: 25 TABLET ORAL at 09:34

## 2022-10-13 RX ADMIN — METOPROLOL SUCCINATE 25 MG: 25 TABLET, EXTENDED RELEASE ORAL at 09:34

## 2022-10-13 RX ADMIN — LOSARTAN POTASSIUM 100 MG: 50 TABLET, FILM COATED ORAL at 09:34

## 2022-10-13 RX ADMIN — OXYCODONE HYDROCHLORIDE AND ACETAMINOPHEN 1 TABLET: 5; 325 TABLET ORAL at 21:47

## 2022-10-13 RX ADMIN — ENOXAPARIN SODIUM 40 MG: 100 INJECTION SUBCUTANEOUS at 05:37

## 2022-10-13 RX ADMIN — VANCOMYCIN HYDROCHLORIDE 750 MG: 750 INJECTION, POWDER, LYOPHILIZED, FOR SOLUTION INTRAVENOUS at 03:08

## 2022-10-13 RX ADMIN — ROSUVASTATIN CALCIUM 5 MG: 10 TABLET, COATED ORAL at 21:47

## 2022-10-13 RX ADMIN — SODIUM CHLORIDE, PRESERVATIVE FREE 10 ML: 5 INJECTION INTRAVENOUS at 21:50

## 2022-10-13 RX ADMIN — BUDESONIDE 250 MCG: 0.25 INHALANT RESPIRATORY (INHALATION) at 07:57

## 2022-10-13 RX ADMIN — SODIUM CHLORIDE, PRESERVATIVE FREE 10 ML: 5 INJECTION INTRAVENOUS at 14:53

## 2022-10-13 RX ADMIN — DOCUSATE SODIUM 100 MG: 100 CAPSULE ORAL at 21:47

## 2022-10-13 RX ADMIN — OXYCODONE HYDROCHLORIDE AND ACETAMINOPHEN 1 TABLET: 5; 325 TABLET ORAL at 03:14

## 2022-10-13 RX ADMIN — HYDROCHLOROTHIAZIDE 25 MG: 25 TABLET ORAL at 09:34

## 2022-10-13 NOTE — PROGRESS NOTES
[facility] Pharmacokinetic Monitoring Service - Vancomycin - Day 2    Bonnie Austin is a 65 y.o. female starting on vancomycin therapy for Surgical Prophylaxis. Pharmacy consulted by Dr. Nikky Spencer for monitoring and adjustment. Target Concentration: Goal AUC/JOVANI 400-600 mg*hr/L    Additional Antimicrobials: none    Pertinent Laboratory Values: Wt Readings from Last 1 Encounters:   10/12/22 133.3 kg (293 lb 12.8 oz)     Temp Readings from Last 1 Encounters:   10/13/22 97.9 °F (36.6 °C)     No components found for: PROCAL  Estimated Creatinine Clearance: 79.9 mL/min (based on SCr of 0.99 mg/dL). No results for input(s): WBC in the last 72 hours. No lab exists for component: CREATININE,  BUN    Pertinent Cultures:  Culture Date Source Results   10/8 wound HEAVY * Methicillin Resistant Staphylococcus aureus    MRSA Nasal Swab: N/A. Non-respiratory infection. .    Plan:  Dosing recommendations based on Bayesian software  Start vancomycin 2000 mg LD x 1, Followed by 750 mg IV Q12H  Anticipated AUC of 495 and trough concentration of 15.6 at steady state  Current  after second dose  Renal labs as indicated   Vancomycin concentration ordered for 10/14 @ 0400   Pharmacy will continue to monitor patient and adjust therapy as indicated    Thank you for the consult,  Mel Nova, PHARMD  10/13/2022 1:21 PM

## 2022-10-13 NOTE — PROGRESS NOTES
Problem: Falls - Risk of  Goal: *Absence of Falls  Description: Document Joelle Jalil Fall Risk and appropriate interventions in the flowsheet.   Outcome: Progressing Towards Goal  Note: Fall Risk Interventions:            Medication Interventions: Assess postural VS orthostatic hypotension, Patient to call before getting OOB, Teach patient to arise slowly    Elimination Interventions: Call light in reach, Patient to call for help with toileting needs

## 2022-10-13 NOTE — PROGRESS NOTES
19:30 Assessment completed. Lungs are clear bilat. Wound vac dsg remains C/D/I with serosanguinous drainage in canister. Resting quietly in bed x for voiding per BR w/o difficulty. 23:25 Bedside shift change report given to Akshat Kaiser RN (oncoming nurse) by Bill Hdez RN (offgoing nurse). Report included the following information SBAR.

## 2022-10-13 NOTE — ROUTINE PROCESS
1920  Bedside shift change report given to Evette Dueñas RN (oncoming nurse) by Laura Ross RN (offgoing nurse). Report included the following information CYRIL, Mira, and MAR.

## 2022-10-13 NOTE — PROGRESS NOTES
Oral and Written notification given to patient and/or caregiver informing them that they are currently an Outpatient receiving care in our facility. Outpatient services include Observation Services under [address] and [facility] requirements.

## 2022-10-13 NOTE — WOUND CARE
IP WOUND CONSULT    Fabiano Bedolla  MEDICAL RECORD NUMBER:  [id number]  AGE: 65 y.o. GENDER: female  : 1960  TODAY'S DATE:  10/13/2022    GENERAL     [] Follow-up   [x] New Consult    Fabiano Bedolla is a 65 y.o. female referred by:   [x] Physician  [] Nursing  [] Other:         PAST MEDICAL HISTORY    Past Medical History:   Diagnosis Date    Adverse effect of anesthesia     had astmatic attack after having anesthesia for colonoscopy    Arthritis     back - has needed chronic pain manager    Asthma     severe / requires steroids    Chronic pain     back    COVID-19 2020    hospitalized X 7 days.  Covid pneumonia, aching, fever, loss of taste/smell    GERD (gastroesophageal reflux disease)     uses PPI    Hypercholesterolemia     Hypertension     Intestinal malabsorption     Lymph edema     both legs    Morbid obesity ([address]     Morbid obesity with body mass index (BMI) of 50.0 to 59.9 in adult (HCC)     Nausea & vomiting     Smoking history     quit late     Status post gastric bypass for obesity     erica liana    Unspecified sleep apnea     Cpap use in the past    Venous ulcer ([address]     history of        PAST SURGICAL HISTORY    Past Surgical History:   Procedure Laterality Date    COLONOSCOPY,DIAGNOSTIC      in conjunction with open gastric bypass    HX ABDOMINOPLASTY      HX BUNIONECTOMY Bilateral     HX  SECTION      X 2    HX GASTRIC BYPASS      HX HERNIA REPAIR      incisional- abdominal    HX KNEE REPLACEMENT Bilateral 2015    HX ORTHOPAEDIC Right     HX ORTHOPAEDIC Bilateral     bunionectomy    HX OTHER SURGICAL Bilateral     varicose vein stipping    HX OTHER SURGICAL      colonoscopy    HX ROTATOR CUFF REPAIR Right     HX TUBAL LIGATION         FAMILY HISTORY    Family History   Problem Relation Age of Onset    Dementia Father        SOCIAL HISTORY    Social History     Tobacco Use    Smoking status: Former     Years: 0.50     Types: Cigarettes     Quit date: 47     Years since quittin.8    Smokeless tobacco: Never   Vaping Use    Vaping Use: Never used   Substance Use Topics    Alcohol use: Yes     Alcohol/week: 5.0 standard drinks     Types: 5 Glasses of wine per week     Comment: socially on weekends    Drug use: Not Currently     Types: Marijuana       ALLERGIES    Allergies   Allergen Reactions    Latex Rash    Adhesive Tape-Silicones Rash     Patient reports she can tolerate paper tape    Bactrim [Sulfamethoprim Ds] Itching    Codeine Palpitations    Morphine Itching       MEDICATIONS    No current facility-administered medications on file prior to encounter. Current Outpatient Medications on File Prior to Encounter   Medication Sig Dispense Refill    multivitamin with minerals (HAIR,SKIN AND NAILS PO) Take 1 Tablet by mouth daily. OTHER Take 2 Tablets by mouth daily. Generic probiotic      acetaminophen (TYLENOL) 500 mg tablet Take 500 mg by mouth every six (6) hours as needed for Pain. rosuvastatin (CRESTOR) 5 mg tablet Take 5 mg by mouth nightly. Indications: high cholesterol      ibuprofen (MOTRIN) 800 mg tablet Take 800 mg by mouth every eight (8) hours as needed for Pain. fluticasone propion-salmeteroL (ADVAIR/WIXELA) 250-50 mcg/dose diskus inhaler Take 1 Puff by inhalation every twelve (12) hours. Indications: controller medication for asthma      magnesium oxide 500 mg tab Take 1 Tablet by mouth daily. losartan (COZAAR) 100 mg tablet Take 100 mg by mouth daily. Indications: high blood pressure      spironolactone-hydrochlorothiazide (ALDACTAZIDE) 25-25 mg per tablet Take 1 Tablet by mouth daily. Indications: high blood pressure      omeprazole (PRILOSEC) 20 mg capsule Take 20 mg by mouth daily. Indications: gastroesophageal reflux disease      b complex vitamins tablet Take 3 Tablets by mouth daily. multivitamin (ONE A DAY) tablet Take 1 Tab by mouth daily.       albuterol (PROVENTIL HFA, VENTOLIN HFA, PROAIR HFA) 90 mcg/actuation inhaler Take 2 Puffs by inhalation every four (4) hours as needed for Wheezing. naloxone (NARCAN) 4 mg/actuation nasal spray Use 1 spray intranasally, then discard. Repeat with new spray every 2 min as needed for opioid overdose symptoms, alternating nostrils. (Patient not taking: Reported on 10/12/2022) 1 Each 0    LORazepam (ATIVAN) 0.5 mg tablet Take 0.5 mg by mouth as needed for Anxiety. benzonatate (TESSALON) 100 mg capsule Take 100 mg by mouth three (3) times daily as needed for Cough. metoprolol succinate (TOPROL-XL) 25 mg XL tablet Take 25 mg by mouth daily. Indications: high blood pressure      albuterol-ipratropium (DUO-NEB) 2.5 mg-0.5 mg/3 ml nebu 3 mL by Nebulization route as needed. Wt Readings from Last 3 Encounters:   10/12/22 133.3 kg (293 lb 12.8 oz)   10/11/22 133.4 kg (294 lb)   10/08/22 130.6 kg (288 lb)       [email or web address] Vitals  /65   Pulse 78   Temp 98.3 °F (36.8 °C)   Resp 16   Ht 5' 3\" (1.6 m)   Wt 133.3 kg (293 lb 12.8 oz)   LMP 06/30/2014   SpO2 95%   Breastfeeding No   BMI 52.04 kg/m²       ASSESSMENT     Skin impairment Identification:  Type:  surgical    Contributing Factors: obesity    Wound Foot Left (Active)   Number of days: 1319       Wound Abdomen (Active)   Wound Image   10/13/22 1000   Wound Etiology Surgical 10/13/22 1000   Dressing Status New drainage noted; Intact; Old drainage noted 10/13/22 1000   Cleansed Cleansed with saline 10/13/22 1000   Dressing/Treatment Collagen;Negative Pressure Wound Therapy 10/13/22 1000   Dressing Change Due 10/17/22 10/13/22 1000   Wound Length (cm) 8 cm 10/13/22 1000   Wound Width (cm) 3 cm 10/13/22 1000   Wound Depth (cm) 4.5 cm 10/13/22 1000   Wound Surface Area (cm^2) 24 cm^2 10/13/22 1000   Wound Volume (cm^3) 108 cm^3 10/13/22 1000   Distance Tunneling (cm) 2.5 cm 10/13/22 1000   Direction of Tunnel 12 o'clock 10/13/22 1000   Wound Assessment Granulation tissue; Other (Comment) 10/13/22 1000   Drainage Amount Moderate 10/13/22 1000   Drainage Description Sanguineous; Serosanguinous 10/13/22 1000   Wound Odor None 10/13/22 1000   Whitney-Wound/Incision Assessment Intact 10/13/22 1000   Edges Defined edges 10/13/22 1000   Wound Thickness Description Full thickness 10/13/22 1000   Number of days:        Incision 07/30/21 Foot Right (Active)   Number of days: 440       Incision 09/07/22 Abdomen (Active)   Number of days: 36       Incision 10/12/22 Abdomen (Active)   Dressing Status Clean;Dry; Intact 10/13/22 0903   Dressing/Treatment ABD pad 10/13/22 0903   Drainage Amount None 10/13/22 0903   Wound Odor None 10/13/22 0903   Number of days: 1      Negative Pressure    NAME:  Kenia Yarbrough  YOB: 1960  MEDICAL RECORD NUMBER:  [id number]  DATE:  10/11/2022    Applied Negative Pressure to abdominal  wound. [x] Applied skin barrier prep to whitney-wound. [x] Cut strips of plastic drape to picture frame wound so that whitney-wound is covered with the drape. [x] Cut sponge, gauze or channel drain to size which will fit into the wound/ulcer bed without being forced. [x] Be sure the sponge is large enough to hold the entire round plastic flange which is attached to the tubing. Never allow flange to be larger than the sponge or it will produce suction damaging intact skin. Total number of individual pieces of foam used within the wound bed: 3 pieces black sponge 1 wrapped in mepitel one  [x] Covered sponge, gauze or channel drain with plastic drape. [x] Cut a hole in this plastic drape directly over the sponge the same size as the plastic drain tubing. [x] Removed plastic liner from flange and apply it directly over the hole you cut. [x] Removed the plastic cover from the flange. [x] Attached the tubing to the wound/ulcer Negative Pressure Therapy and turn it on to be sure a vacuum is created and that there are no leaks. [x] If air leaks occur, use plastic drape to patch them. Response to treatment: Well tolerated by patient      Applied per  Guidelines      Electronically signed by Josiah Mejia RN on 10/13/2022 at 11:55 AM     PLAN     Skin Care & Pressure Relief Recommendations  Minimize layers of linen  Pads under patient to optimize support surface    Recommendations: keep dressing intact if not able to maintain suction and apply wet to dry.     Teaching completed with:   [x] Patient           [] Family member       [] Caregiver          [x] Nursing  [] Other    Patient/Caregiver Teaching:  Level of patient/caregiver understanding able to:   [x] Indicates understanding       [] Needs reinforcement  [] Unsuccessful      [] Verbal Understanding  [] Demonstrated understanding       [] No evidence of learning  [] Refused teaching         [] N/A       Electronically signed by Josiah Mejia RN on 10/13/2022 at 11:48 AM

## 2022-10-13 NOTE — PROGRESS NOTES
Patient Name: Richa Rizvi   Age: 65 y.o. Sex:  female  YOB: 1960   Medical Record Number: [id number]      Antimicrobial  Vancomycin   Indication Surgical Site Infection   Dose / Interval           Current Antimicrobial Therapy (168h ago, onward)       Ordered     Start Stop    10/12/22 2132  vancomycin (VANCOCIN) 750 mg in 0.9% sodium chloride 250 mL (VIAL-MATE)  750 mg,   IntraVENous,   EVERY 12 HOURS        References:    Lexicomp    10/13/22 0300 --                     Last Level (if applicable) No results found for: DOSE, TMG, DTG  Vancomycin   No results found for: VANCP, VANCT, VANCR   Gentamicin   No results found for: GENP, GENT, GENR]  Tobramycin   No results found for: TOBP, TOBT, TOBR   Amikacin   No results found for: Norval Jointer, AMIKT, DAMIKT, DAMIKR     Cultures (7 most recent)   GRAM STAIN   Date Value Ref Range Status   10/08/2022 NO WBC'S SEEN   Final   10/08/2022 FEW GRAM POSITIVE COCCI IN PAIRS   Final   10/08/2022 OCCASIONAL GRAM POSITIVE COCCI IN CLUSTERS   Final     Culture result:   Date Value Ref Range Status   10/08/2022 NO GROWTH 4 DAYS   Preliminary   10/08/2022 (A)   Final    HEAVY * Methicillin Resistant Staphylococcus aureus *   10/08/2022 NO GROWTH 4 DAYS   Preliminary   2015    Final    MRSA target DNA is not detected (presumptive not colonized with MRSA)      Renal Overview (72 hr)         Recent Labs     10/12/22  2041   BUN 15   CREA 0.97       CrCl (Current): Estimated Creatinine Clearance: 81.5 mL/min (based on SCr of 0.97 mg/dL). Lactic Acid    (Sepsis Criteria: >2.0 mmol/L) Lab Results   Component Value Date/Time    Lactic acid 0.8 10/08/2022 08:45 PM      Procalcitonin (0.10-0.49 NG/ML) No results found for: PCT   Hepatic Overview (72 hr) No results for input(s): ALT, AP in the last 72 hours.     No lab exists for component: SGOT   Temp (24-hr Max) Temp (24hrs), Av.7 °F (36.5 °C), Min:97 °F (36.1 °C), Max:98.4 °F (36.9 °C) Hematology No results for input(s): WBC, HGB, HCT, PLT, GRANS, BANDS, ANEU, LAC, HGBEXT, HCTEXT, PLTEXT in the last 72 hours. DOT  1     Notes   Loading dose: 2000 mg at 15:17 10/12/2022. Regimen: 750 mg IV every 12 hours.   Start time: 21:35 on 10/12/2022  Exposure target: AUC24 (range)400-600 mg/L.hr   AUC24,ss: 514 mg/L.hr  Probability of AUC24 > 400: 69 %  Ctrough,ss: 16.4 mg/L  Probability of Ctrough,ss > 20: 38 %  Probability of nephrotoxicity (Lodise MARTITA 2009): 12 %           Denise Espinoza [facility]  Clinical Pharmacist  [phone]

## 2022-10-13 NOTE — PROGRESS NOTES
D/C Plan: [facility] with physician follow up and a wound vac    Chart reviewed. Per H&P \"Confirmed intra-abdominal abscess on CT with moderate amount of drainage noted. No odor detected. Mild abdominal erythema. Also confirmed MRSA from wound cultures. Started Doxycycline last night. Seen with Dr. Kathryn Etienne with plan for I&D of abscess tomorrow at [facility]. IV antibiotics to begin after surgery with consult from Infectious disease planned. .\"    Noted plan for a wound vac. Given this, pt will need HH to assist with care transition. CM spoke with pt to discuss care transition. CM discussed HH. Pt is agreeable to [facility] upon discharge. CM offered FOC. Pt has no preference of [facility] agency and is agreeable to using [facility]. CMS has been notified to assist.  Wound care will assist with ordering home wound vac. Anticipate pt will transition home with the above services when medically stable. Care Management Interventions  Mode of Transport at Discharge:  Other (see comment) (Family)  Transition of Care Consult (CM Consult): Discharge Planning, [address]: Yes  Health Maintenance Reviewed: Yes  Support Systems: Spouse/Significant Other  The Plan for Transition of Care is Related to the Following Treatment Goals : [facility] with physician follow up  The Patient and/or Patient Representative was Provided with a Choice of Provider and Agrees with the Discharge Plan?: Yes  Name of the Patient Representative Who was Provided with a Choice of Provider and Agrees with the Discharge Plan: pt  Freedom of Choice List was Provided with Basic Dialogue that Supports the Patient's Individualized Plan of Care/Goals, Treatment Preferences and Shares the Quality Data Associated with the Providers?: Yes  Discharge Location  Patient Expects to be Discharged to[de-identified] Home with home health

## 2022-10-13 NOTE — PROGRESS NOTES
Comprehensive Nutrition Assessment    Type and Reason for Visit: Initial, Wound, Consult    Nutrition Recommendations/Plan:   Continue current diet order. Recommend adding Jonathan once daily with dinner for wound healing. Malnutrition Assessment:  Malnutrition Status:  No malnutrition (10/13/22 1357)        Nutrition Assessment:    62yoF H/o asthma, HTN, Asthma, hyperlipidemia, morbid obesity, gastric bypass, GERD. Wt hx: 293lb (10/12/22), 286lb (5/2/22), 281lb (8/4/21)-no significant wt loss noted. Admitted 10/12/22 with an abcess in her abdominal wall-required surgical I&D. Consult placed to nutrition for wounds. Spoke to pt on phone and states that there are no changes in her appetite and she is eating well. Will add Jonathan once daily for wound healing. Nutrition Related Findings:    BM 10/12/22. Labs: GFR-AA 52, albumin 3.0. Meds: colace, protonix, crestor. Wound Type: Surgical incision    Current Nutrition Intake & Therapies:        ADULT DIET Regular    Anthropometric Measures:  Height: 5' 2.99\" (160 cm)  Ideal Body Weight (IBW): 115 lbs (52 kg)  Admission Body Weight: 293 lb  Current Body Wt:  132.9 kg (293 lb), 254.8 % IBW.  Standing scale  Current BMI (kg/m2): 51.9        Weight Adjustment: No adjustment                 BMI Category: Obese class 3 (BMI 40.0 or greater)    Estimated Daily Nutrient Needs:  Energy Requirements Based On: Formula  Weight Used for Energy Requirements: Current  Energy (kcal/day): 4306-8599  Weight Used for Protein Requirements: Current  Protein (g/day): 160-200  Method Used for Fluid Requirements: 1 ml/kcal  Fluid (ml/day): 3339-6963    Nutrition Diagnosis:   Inadequate protein intake related to acute injury/trauma as evidenced by wounds    Nutrition Interventions:   Food and/or Nutrient Delivery: Continue current diet, Start oral nutrition supplement  Nutrition Education/Counseling: No recommendations at this time  Coordination of Nutrition Care: Continue to monitor while inpatient       Goals:     Goals: Meet at least 75% of estimated needs, by next RD assessment       Nutrition Monitoring and Evaluation:   Behavioral-Environmental Outcomes: None identified  Food/Nutrient Intake Outcomes: Food and nutrient intake, Supplement intake  Physical Signs/Symptoms Outcomes: Biochemical data, GI status, Meal time behavior, Weight    Discharge Planning:    Continue current diet    [employer],

## 2022-10-13 NOTE — PROGRESS NOTES
1190  Pt is awake, alert, oriented x4. Sitting at edge of bed. Pain level 4/10. Lungs are clear. Abdomen soft ,obese, with hypoactive Bs. ABD pad to abdomen dry and intact. Seen by Dr Leslie Mullins this morning. Pt for Wound vac placement. 1  Pt was seen by  Wound Care Nurse. Wound vac was applied to mid abdomen draining small amt of serosanguinous  d/c.  Small square mepilex appklied to small open area on Left side of abdomen from an old DANIELITO site. No drainage noted from open area. [phone]  Pt is awake, alert, oriented x4. Sitting at edge of bed. Wound vac with small amt of serosanguinous drainage. Pain level 5/10. Does not want pain med at this time. 32 61 16  Pt is receiving IV Vancomycin. Pt complaining of pain to IV site. IV flushes well but no blood return. Pt does not want to d/c IV until seen by Medic.   1750  Medic in to restart IV.  1845   New Medline IV g18 to RAC was inserted  by [employer]. Vancomycin was restarted. Wound Vac detected a leak. Round piece to suction tubing attached to pt became  loose and was reattached and placed Tegaderm to seal it in place. No Wound Care Nurse on call. Will call Wound care Nurse to come see pt/ Wound vac in the morning. No further leak detected and suction is working at this time.

## 2022-10-13 NOTE — PROGRESS NOTES
Problem: Falls - Risk of  Goal: *Absence of Falls  Description: Document Timothy Jiménez Fall Risk and appropriate interventions in the flowsheet.   Outcome: Progressing Towards Goal  Note: Fall Risk Interventions:            Medication Interventions: Teach patient to arise slowly    Elimination Interventions: Call light in reach

## 2022-10-13 NOTE — PROGRESS NOTES
In-Patient CardioPulmonary Rehab Recommendation:    Pt has been identified as possibly benefiting from attending Outpatient  Pulmonary Rehabilitation after discharge from hospital.  This recommendation is based on the following diagnosis found in their chart:    Asthma      Pt visited by member of cardiopulmonary rehab staff and given information about the program.  If you feel that patient is appropriate and would benefit, please sent a consult to Cardiopulmonary Rehab prior to discharge.

## 2022-10-14 ENCOUNTER — HOME HEALTH ADMISSION (OUTPATIENT)
Dept: HOME HEALTH SERVICES | Facility: HOME HEALTH | Age: 62
End: 2022-10-14
Payer: COMMERCIAL

## 2022-10-14 VITALS
BODY MASS INDEX: 51.91 KG/M2 | RESPIRATION RATE: 17 BRPM | WEIGHT: 293 LBS | TEMPERATURE: 97.8 F | HEIGHT: 63 IN | DIASTOLIC BLOOD PRESSURE: 71 MMHG | SYSTOLIC BLOOD PRESSURE: 146 MMHG | HEART RATE: 62 BPM | OXYGEN SATURATION: 98 %

## 2022-10-14 LAB
ANION GAP SERPL CALC-SCNC: 4 MMOL/L (ref 3–18)
BACTERIA SPEC CULT: NORMAL
BUN SERPL-MCNC: 21 MG/DL (ref 7–18)
BUN/CREAT SERPL: 21 (ref 12–20)
CALCIUM SERPL-MCNC: 8.9 MG/DL (ref 8.5–10.1)
CHLORIDE SERPL-SCNC: 106 MMOL/L (ref 100–111)
CO2 SERPL-SCNC: 30 MMOL/L (ref 21–32)
CREAT SERPL-MCNC: 1 MG/DL (ref 0.6–1.3)
GLUCOSE SERPL-MCNC: 101 MG/DL (ref 74–99)
POTASSIUM SERPL-SCNC: 4.7 MMOL/L (ref 3.5–5.5)
SERVICE CMNT-IMP: NORMAL
SODIUM SERPL-SCNC: 140 MMOL/L (ref 136–145)
VANCOMYCIN SERPL-MCNC: 10.1 UG/ML (ref 5–40)

## 2022-10-14 PROCEDURE — 90686 IIV4 VACC NO PRSV 0.5 ML IM: CPT | Performed by: SURGERY

## 2022-10-14 PROCEDURE — 90471 IMMUNIZATION ADMIN: CPT

## 2022-10-14 PROCEDURE — 36415 COLL VENOUS BLD VENIPUNCTURE: CPT

## 2022-10-14 PROCEDURE — 80202 ASSAY OF VANCOMYCIN: CPT

## 2022-10-14 PROCEDURE — 74011000250 HC RX REV CODE- 250: Performed by: SURGERY

## 2022-10-14 PROCEDURE — 94640 AIRWAY INHALATION TREATMENT: CPT

## 2022-10-14 PROCEDURE — 74011250636 HC RX REV CODE- 250/636: Performed by: SURGERY

## 2022-10-14 PROCEDURE — 80048 BASIC METABOLIC PNL TOTAL CA: CPT

## 2022-10-14 PROCEDURE — 74011000636 HC RX REV CODE- 636: Performed by: SURGERY

## 2022-10-14 PROCEDURE — G0378 HOSPITAL OBSERVATION PER HR: HCPCS

## 2022-10-14 PROCEDURE — 74011250637 HC RX REV CODE- 250/637: Performed by: SURGERY

## 2022-10-14 RX ORDER — LINEZOLID 600 MG/1
600 TABLET, FILM COATED ORAL EVERY 12 HOURS
Qty: 20 TABLET | Refills: 1 | Status: SHIPPED | OUTPATIENT
Start: 2022-10-14

## 2022-10-14 RX ORDER — LINEZOLID 600 MG/1
600 TABLET, FILM COATED ORAL EVERY 12 HOURS
Status: DISCONTINUED | OUTPATIENT
Start: 2022-10-14 | End: 2022-10-14 | Stop reason: HOSPADM

## 2022-10-14 RX ADMIN — BUDESONIDE 250 MCG: 0.25 INHALANT RESPIRATORY (INHALATION) at 08:52

## 2022-10-14 RX ADMIN — SPIRONOLACTONE 25 MG: 25 TABLET ORAL at 09:05

## 2022-10-14 RX ADMIN — METOPROLOL SUCCINATE 25 MG: 25 TABLET, EXTENDED RELEASE ORAL at 09:05

## 2022-10-14 RX ADMIN — ALBUTEROL SULFATE 2.5 MG: 2.5 SOLUTION RESPIRATORY (INHALATION) at 08:52

## 2022-10-14 RX ADMIN — LOSARTAN POTASSIUM 100 MG: 50 TABLET, FILM COATED ORAL at 09:06

## 2022-10-14 RX ADMIN — VANCOMYCIN HYDROCHLORIDE 750 MG: 750 INJECTION, POWDER, LYOPHILIZED, FOR SOLUTION INTRAVENOUS at 07:00

## 2022-10-14 RX ADMIN — ENOXAPARIN SODIUM 40 MG: 100 INJECTION SUBCUTANEOUS at 05:54

## 2022-10-14 RX ADMIN — INFLUENZA VIRUS VACCINE 0.5 ML: 15; 15; 15; 15 SUSPENSION INTRAMUSCULAR at 12:41

## 2022-10-14 RX ADMIN — HYDROCHLOROTHIAZIDE 25 MG: 25 TABLET ORAL at 09:05

## 2022-10-14 RX ADMIN — LINEZOLID 600 MG: 600 TABLET, FILM COATED ORAL at 10:11

## 2022-10-14 RX ADMIN — PANTOPRAZOLE SODIUM 40 MG: 40 TABLET, DELAYED RELEASE ORAL at 09:05

## 2022-10-14 RX ADMIN — DOCUSATE SODIUM 100 MG: 100 CAPSULE ORAL at 09:05

## 2022-10-14 RX ADMIN — SODIUM CHLORIDE, PRESERVATIVE FREE 10 ML: 5 INJECTION INTRAVENOUS at 06:01

## 2022-10-14 NOTE — PROGRESS NOTES
Verbal and Bedside change of shift report given to America Tran RN by Antony Barthel RN. Opportunity for questions were provided. [facility] care of patient. Assessment complete at this time. Pt alert and oriented x 4. Lungs clear bilaterally on room air. Patient denies shortness of breath/chest pain. No numbness and tingling in all extremities. 20 G IV to R. Forearm dressing clean, dry, & intact. Stated pain 0/10. SCD's/ Tate hose in place. Wound vac dressing to abdomen that is clean, dry, & intact. Incentive spirometer at bedside. Patient taught how to use breathing tool w/ proper demonstration. Instructed to use 10x Q1. Verbalized understanding. Patient oriented to room and call bell. Call bell within reach. Bed in lowest position. 1241  0.5 ml Influenza vaccine given prior to discharge. Discharge instructions reviewed w/ patient and family. Verbal understanding of instructions. Opportunity for questions were provided.

## 2022-10-14 NOTE — DISCHARGE INSTRUCTIONS
Post-Operative Discharge Instructions  Lynita Bernheim. Gideon Santos M.D.  [address] [place], [place] [address]  [phone]    Patient: Kevan Anderson MRN: [id number]  CSN: [id number]    YOB: 1960  Age: 65 y.o. Sex: female    DOA: 10/12/2022 LOS: [unfilled]  Discharge Date: [unfilled]     Acute Diagnoses:  Deep postoperative wound infection [T81.42XA]    Chronic Medical Diagnoses:  [unfilled]    Diet  Resume prior to surgery diet as tolerated. Activity  Do not drive a car or operate any hazardous machinery the day of surgery. Rest quietly today. No bending or heavy lifting. You may resume other prior to surgery activities as tolerated. You may remove the bandage and shower in 1 day. Drain / Wound Care  Follow all drain / wound care instructions exactly as explained by the Nurse at time of discharge. Apply an ice pack to the surgical site for 48 hours. Do not put any salves or ointments on the wound. Allow it to form a dry scab. Leave steri-strips / Dermabond alone. They should be allowed to fall off on their own in 7-14 days. Medications  It is important to take your medications exactly as they are prescribed. Keep your medication in the bottles provided by the pharmacist, and keep a list of the medication names, dosages, and times they should be taken in your wallet. Call 911 anytime you think you may need emergency care. For example, call if:  You passed out (lost consciousness). You have severe trouble breathing. You have sudden chest pain and shortness of breath. Notify your Surgeon for any of the following:  Fever, chills, nausea, vomiting, severe abdominal pain or bleeding. If you experience any redness or discharge or sign of infection. Persistent nausea lasting more than 24 hours. If you are unable to reach your Surgeon for any of the symptoms above, you should proceed directly to the nearest Emergency Department.     Post-Operative Appointment Information    Call Dr. Eleonora Mccray office tomorrow morning at [phone] to schedule a post-operative office visit in one (1) week. If any questions or concerns arise, call your Surgeon at [phone].

## 2022-10-14 NOTE — ROUTINE PROCESS
Bedside and Verbal shift change report given to Erik Camacho RN (oncoming nurse) by Nadia Carrasco RN (offgoing nurse). Report included the following information SBAR and Kardex. 0036-Assessment complete. Stable. Abdominal drain, patent. Call light and personal items within  reach. Patient ambulating to bathroom, voiding. Denies need for pain medication at this time. Pleasant. 0411-No change from initial assessment. Resting in bed, pleasant.

## 2022-10-14 NOTE — ROUTINE PROCESS
Verbal shift change report given to Farzana Castillo RN (oncoming nurse) by Mansi Fox RN (offgoing nurse). Report included the following information SBAR and Kardex.

## 2022-10-15 LAB
BACTERIA SPEC CULT: ABNORMAL
GRAM STN SPEC: ABNORMAL
GRAM STN SPEC: ABNORMAL
SERVICE CMNT-IMP: ABNORMAL

## 2022-10-15 NOTE — OP NOTES
[facility]  OPERATIVE REPORT    Name:  Carmella Garcia  MR#:   [id number]  :  1960  ACCOUNT #:  [de-identified]  DATE OF SERVICE:  10/12/2022    PREOPERATIVE DIAGNOSIS:  Abdominal wall abscess. POSTOPERATIVE DIAGNOSIS:  Abdominal wall abscess. PROCEDURE PERFORMED:  Incision and drainage of abdominal wall abscess. SURGEON:  Breanne Cade MD    ASSISTANT:  None. ANESTHESIA:  [facility] with local.    COMPLICATIONS:  none. SPECIMENS REMOVED:  Culture. IMPLANTS:  None. ESTIMATED BLOOD LOSS:  Minimal.    INDICATIONS:  The patient presents with an abdominal wall abscess. She will undergo incision and drainage. I discussed risks, benefits, and alternatives to her. She understands and wished to proceed. PROCEDURE:  She was placed in a supine position. Her abdomen was prepped and draped in the usual fashion. An incision was made over previous midline incision with a knife, carried down to the subcutaneous tissues with electrocautery. There, there was apparently a large seroma which was entered and aspirated. Cultures were taken. There was no mesh seen. The area was irrigated with sterile saline and the wound was packed with gauze. The patient tolerated the procedure well.       Dov Patel MD      [id number]/  D:  10/14/2022 20:49  T:  10/15/2022 1:42  JOB #:  [id number]

## 2022-10-17 ENCOUNTER — HOME CARE VISIT (OUTPATIENT)
Dept: SCHEDULING | Facility: HOME HEALTH | Age: 62
End: 2022-10-17
Payer: COMMERCIAL

## 2022-10-17 ENCOUNTER — HOME CARE VISIT (OUTPATIENT)
Dept: HOME HEALTH SERVICES | Facility: HOME HEALTH | Age: 62
End: 2022-10-17
Payer: COMMERCIAL

## 2022-10-17 VITALS
TEMPERATURE: 98.1 F | DIASTOLIC BLOOD PRESSURE: 80 MMHG | HEART RATE: 74 BPM | OXYGEN SATURATION: 97 % | SYSTOLIC BLOOD PRESSURE: 130 MMHG

## 2022-10-17 PROCEDURE — G0299 HHS/HOSPICE OF RN EA 15 MIN: HCPCS

## 2022-10-17 PROCEDURE — 400013 HH SOC

## 2022-10-18 NOTE — CASE COMMUNICATION
Please note Severe drug interaction:  doxycycline and magnesium oxide    Simultaneous administration of di- or trivalent cations may result in decreased levels of and therapeutics effects from tetracyclines.

## 2022-10-18 NOTE — HOME HEALTH
Skilled services/Home bound verification:     Skilled Reason for admission/summary of clinical condition:  Patient is post double hernia surgery that has had complications and now requires a wound vac to abdomen. .  This patient is homebound for the following reasons Requires considerable and taxing effort to leave the home , Requires the assistance of 1 or more persons to leave the home  and Only leaves the home for medical reasons or [facility] services and are infrequent and of short duration for other reasons . Caregiver: spouse. Caregiver assists with ADL, meals and any other needs . Medications reconciled and all medications are available in the home this visit. The following education was provided regarding medications: take all medications as ordered   Medications  are reconciled  at this time. High risk medication teaching regarding anticoagulants, hyperglycemic agents or opiod narcotics performed (specify) NA,    Dr Susannah Colvin notified of any discrepancies/look a like medications/medication interactions severe drug interaction: doxycycline and magnesium oxide    Simultaneous administration of di- or trivalent cations may result in decreased levels of and therapeutics effects from tetracyclines. moderate interaction : spironolactone-hydrochlorothiazide and losartan    Concurrent use of potassium sparing diuretics with an ARB may result in hyperkalemia. Home health supplies by type and quantity ordered/delivered this visit include: gauze, mepitel, abd pads,     Patient education provided this visit to include: SN educated on how to change cannister, how to find a leaka nd what to do if any alarms .       Patient level of understanding of education provided: patient verbalized understanding     Sharps Education Provided: NA  Patient response to procedure performed:  patient had no complaints during wound care     Home exercise program/Homework provided: deep breathing     Pt/Caregiver instructed on plan of care and are agreeable to plan of care at this time. Physician Dr Eddie Crum notified of patient admission to home health and plan of care including anticipated frequency of SN and treatments/interventions/modalities of education. Discharge planning discussed with patient and caregiver. Discharge planning as follows: when goals met and education is complete . Pt/Caregiver did verbalize understanding of discharge planning. Next MD appointment Wednesday  (date) with wound clinic  MD/NP/PA. Patient/caregiver encouraged/instructed to keep appointment as lack of follow through with physician appointment could result in discontinuation of home care services for non-compliance.

## 2022-10-19 ENCOUNTER — HOME CARE VISIT (OUTPATIENT)
Dept: HOME HEALTH SERVICES | Facility: HOME HEALTH | Age: 62
End: 2022-10-19
Payer: COMMERCIAL

## 2022-10-20 ENCOUNTER — HOSPITAL ENCOUNTER (OUTPATIENT)
Dept: WOUND CARE | Age: 62
Discharge: HOME | End: 2022-10-20
Attending: HOSPITALIST
Payer: COMMERCIAL

## 2022-10-20 ENCOUNTER — HOME CARE VISIT (OUTPATIENT)
Dept: HOME HEALTH SERVICES | Facility: HOME HEALTH | Age: 62
End: 2022-10-20
Payer: COMMERCIAL

## 2022-10-20 VITALS
SYSTOLIC BLOOD PRESSURE: 150 MMHG | DIASTOLIC BLOOD PRESSURE: 73 MMHG | HEART RATE: 80 BPM | TEMPERATURE: 98.7 F | RESPIRATION RATE: 18 BRPM

## 2022-10-20 DIAGNOSIS — S31.109A OPEN WOUND OF ABDOMEN, INITIAL ENCOUNTER: Primary | ICD-10-CM

## 2022-10-20 DIAGNOSIS — T81.42XA DEEP POSTOPERATIVE WOUND INFECTION: ICD-10-CM

## 2022-10-20 PROCEDURE — 97605 NEG PRS WND THER DME<=50SQCM: CPT

## 2022-10-20 NOTE — DISCHARGE INSTRUCTIONS
Discharge Instructions from  [facility]  [address] [place], [facility]  [phone] Fax [phone]    NAME:  Natalie Donis OF BIRTH:  1960  MEDICAL RECORD NUMBER:  [id number]  DATE:  10/20/22      Wound Cleansing:   Do not scrub or use excessive force. Cleanse wound prior to applying a clean dressing with:  [] Normal Saline [] Keep Wound Dry in Shower    [x] Wound Cleanser   [] Cleanse wound with Mild Soap & Water  [] May Shower at Discharge   [] Other:         Dressings:           Wound Location: abdomen     Apply Negative Pressure to mid abdominal wound(s)/ulcer(s). [x] Apply skin barrier prep to whitney-wound. [x] Cut strips of plastic drape to picture frame wound so that whitney-wound is     covered with the drape. [x] If bridging dressing to less prominent site, cover any intact skin that will come in contact with the Negative Pressure Therapy sponge, gauze or channel drain with plastic drape. The sponge should never touch intact skin. [x] Cut sponge, gauze or channel drain to size which will fit into the wound/ulcer bed without being forced. [x] Be sure the sponge is large enough to hold the entire round plastic flange which is attached to the tubing. Never allow flange to be larger than the sponge or it will produce suction damaging intact skin. Total number of individual pieces of foam used within the wound bed: 2  [x] If bridging the dressing away from the primary site, be sure the bridge leads to a piece of sponge large enough to hold the entire flange without allowing any of the flange to overlap onto intact skin. [x] Covered sponge, gauze or channel drain with plastic drape. [x] Cut a hole in this plastic drape directly over the sponge the same size as the plastic drain tubing. [x] Removed plastic liner from flange and apply it directly over the hole you cut. [x] Removed the plastic cover from the flange.    [x] Attached the tubing to the wound/ulcer Negative Pressure Therapy and turn it on to be sure a vacuum is created and that there are no leaks. [x] If air leaks occur, use plastic drape to patch them. [x] Secured Negative Pressure Therapy dressing with ace wrap loosely if located on an extremity. Maintain tubing outside of ace wrap. Tubing must not exert pressure on intact skin. Negative Pressure:           Wound Location:   [x] Pressure@        125   mm/Hg  [x]Continuous []Intermittent   [x] Black  [x] White Foam [] Other:   [x]Change dressing: [x]Three times per week    []Other: white sponge loosely packed into wound    Left quadrant drain site apply collagen and alginate, cover with drape, change with each vac change    Dietary:  [x] Diet as tolerated: [] Calorie Diabetic Diet: [] No Added Salt:  [] Increase Protein: [] Other:   Activity:  [x] Per surgeon                                         Return Appointment:  [] Wound and dressing supply provider:   [] ECF or Home Healthcare:  [] Wound Assessment: [] Physician or NP scheduled for Wound Assessment:   [x] Return Appointment: With MD  in  [address] [place] [address])  [] Ordered tests:      Electronically signed Cyn Arnold RN on 10/20/2022 at [address] [place] [address]: Should you experience any significant changes in your wound(s) or have questions about your wound care, please contact the [facility] at [address] [place] [address] 8:00 am - 4:30. If you need help with your wound outside these hours and cannot wait until we are again available, contact your PCP or go to the hospital emergency room. PLEASE NOTE: IF YOU ARE UNABLE TO OBTAIN WOUND SUPPLIES, CONTINUE TO USE THE SUPPLIES YOU HAVE AVAILABLE UNTIL YOU ARE ABLE TO REACH US. IT IS MOST IMPORTANT TO KEEP THE WOUND COVERED AT ALL TIMES.

## 2022-10-20 NOTE — WOUND CARE
[address], [address] [place] [address]  [phone] Fax [phone]    NAME:  Ora Marin  YOB: 1960  MEDICAL RECORD NUMBER:  [id number]  DATE:  10/20/22      Wound Cleansing:   Do not scrub or use excessive force. Cleanse wound prior to applying a clean dressing with:  [] Normal Saline [] Keep Wound Dry in Shower    [x] Wound Cleanser   [] Cleanse wound with Mild Soap & Water  [] May Shower at Discharge   [] Other:         Dressings:           Wound Location: abdomen     Apply Negative Pressure to mid abdominal wound(s)/ulcer(s). [x] Apply skin barrier prep to whitney-wound. [x] Cut strips of plastic drape to picture frame wound so that whitney-wound is     covered with the drape. [x] If bridging dressing to less prominent site, cover any intact skin that will come in contact with the Negative Pressure Therapy sponge, gauze or channel drain with plastic drape. The sponge should never touch intact skin. [x] Cut sponge, gauze or channel drain to size which will fit into the wound/ulcer bed without being forced. [x] Be sure the sponge is large enough to hold the entire round plastic flange which is attached to the tubing. Never allow flange to be larger than the sponge or it will produce suction damaging intact skin. Total number of individual pieces of foam used within the wound bed: 2  [x] If bridging the dressing away from the primary site, be sure the bridge leads to a piece of sponge large enough to hold the entire flange without allowing any of the flange to overlap onto intact skin. [x] Covered sponge, gauze or channel drain with plastic drape. [x] Cut a hole in this plastic drape directly over the sponge the same size as the plastic drain tubing. [x] Removed plastic liner from flange and apply it directly over the hole you cut. [x] Removed the plastic cover from the flange.    [x] Attached the tubing to the wound/ulcer Negative Pressure Therapy and turn it on to be sure a vacuum is created and that there are no leaks. [x] If air leaks occur, use plastic drape to patch them. [x] Secured Negative Pressure Therapy dressing with ace wrap loosely if located on an extremity. Maintain tubing outside of ace wrap. Tubing must not exert pressure on intact skin. Negative Pressure:           Wound Location:   [x] Pressure@        125   mm/Hg  [x]Continuous []Intermittent   [x] Black  [x] White Foam [] Other:   [x]Change dressing: [x]Three times per week    []Other: white sponge loosely packed into wound    Left quadrant drain site apply collagen and alginate, cover with drape, change with each vac change    Dietary:  [x] Diet as tolerated: [] Calorie Diabetic Diet: [] No Added Salt:  [] Increase Protein: [] Other:   Activity:  [x] Per surgeon                                         Return Appointment:  [] Wound and dressing supply provider:   [] ECF or Home Healthcare:  [] Wound Assessment: [] Physician or NP scheduled for Wound Assessment:   [x] Return Appointment: With MD  in  1 Week(s)  [] Ordered tests:      Electronically signed Cyn TALBERTN RN [facility], [facility]. CMSRN/ verbal orders Dr. Pruitt Pap Information: Should you experience any significant changes in your wound(s) or have questions about your wound care, please contact the [facility] at [address] [place] [address] 8:00 am - 4:30. If you need help with your wound outside these hours and cannot wait until we are again available, contact your PCP or go to the hospital emergency room. PLEASE NOTE: IF YOU ARE UNABLE TO OBTAIN WOUND SUPPLIES, CONTINUE TO USE THE SUPPLIES YOU HAVE AVAILABLE UNTIL YOU ARE ABLE TO REACH US. IT IS MOST IMPORTANT TO KEEP THE WOUND COVERED AT ALL TIMES.

## 2022-10-20 NOTE — WOUND CARE
10/20/22 1451   Wound Abdomen   No Date First Assessed or Time First Assessed found. Primary Wound Type: Open incision/surgical site  Location: Abdomen   Wound Image    Wound Etiology Surgical   Dressing Status Intact   Cleansed Wound cleanser   Dressing/Treatment Negative Pressure Wound Therapy   Dressing Change Due 10/24/22   Wound Length (cm) 7.5 cm   Wound Width (cm) 3 cm   Wound Depth (cm) 2.8 cm   Wound Surface Area (cm^2) 22.5 cm^2   Change in Wound Size % 6.25   Wound Volume (cm^3) 63 cm^3   Wound Healing % 42   Wound Assessment Granulation tissue; Exposed Structure Fascia   Drainage Amount Moderate   Drainage Description Serosanguinous   Wound Odor None   Na-Wound/Incision Assessment Intact   Edges Defined edges   Wound Thickness Description Full thickness   Wound Abdomen Left Drain site   Date First Assessed/Time First Assessed: 10/20/22 1455   Present on Hospital Admission: Yes  Wound Approximate Age at First Assessment (Weeks): 1 weeks  Primary Wound Type: Catheter entry/exit site  Location: Abdomen  Wound Location Orientation: Left . ..    Wound Image    Wound Etiology Surgical   Dressing Status Breakthrough drainage noted   Cleansed Wound cleanser   Dressing/Treatment Collagen;Alginate with Ag   Dressing Change Due 10/24/22   Wound Length (cm) 0.2 cm   Wound Width (cm) 0.5 cm   Wound Depth (cm) 0.1 cm   Wound Surface Area (cm^2) 0.1 cm^2   Wound Volume (cm^3) 0.01 cm^3   Wound Assessment Slough;Granulation tissue   Drainage Amount Small   Drainage Description Serosanguinous   Wound Odor None   Na-Wound/Incision Assessment Intact   Edges Attached edges   Wound Thickness Description Full thickness

## 2022-10-21 PROBLEM — S31.109A OPEN WOUND OF ABDOMEN: Status: ACTIVE | Noted: 2022-10-21

## 2022-10-21 RX ORDER — LIDOCAINE HYDROCHLORIDE 20 MG/ML
JELLY TOPICAL ONCE
Status: CANCELLED | OUTPATIENT
Start: 2022-10-21 | End: 2022-10-21

## 2022-10-21 RX ORDER — LIDOCAINE 40 MG/G
CREAM TOPICAL ONCE
Status: CANCELLED | OUTPATIENT
Start: 2022-10-21 | End: 2022-10-21

## 2022-10-21 RX ORDER — CLOBETASOL PROPIONATE 0.5 MG/G
OINTMENT TOPICAL ONCE
Status: CANCELLED | OUTPATIENT
Start: 2022-10-21 | End: 2022-10-21

## 2022-10-21 RX ORDER — BACITRACIN ZINC AND POLYMYXIN B SULFATE 500; 1000 [USP'U]/G; [USP'U]/G
OINTMENT TOPICAL ONCE
Status: CANCELLED | OUTPATIENT
Start: 2022-10-21 | End: 2022-10-21

## 2022-10-21 RX ORDER — LIDOCAINE HYDROCHLORIDE 40 MG/ML
SOLUTION TOPICAL ONCE
Status: CANCELLED | OUTPATIENT
Start: 2022-10-21 | End: 2022-10-21

## 2022-10-21 RX ORDER — LIDOCAINE 50 MG/G
OINTMENT TOPICAL ONCE
Status: CANCELLED | OUTPATIENT
Start: 2022-10-21 | End: 2022-10-21

## 2022-10-21 RX ORDER — MUPIROCIN 20 MG/G
OINTMENT TOPICAL ONCE
Status: CANCELLED | OUTPATIENT
Start: 2022-10-21 | End: 2022-10-21

## 2022-10-21 NOTE — WOUND CARE
[facility]  Initial Consult note         Chief Complaint:  Richa Rizvi is a 65 y.o.  female who presents with open wound of abdomen    Referred by:  Dr. Jose Turcios    HPI:   she had open repair of incarcerated incisional hernia with mesh on 2022. She developed infection and wound dehiscence at incision site. She underwent I&D of abdominal wall abscess on 10/12/2022. Wound cultures grew MRSA. She is on linezolid. Wound caused by: non-healed surgical wound   Current wound care: local wound care  Offloading wound: n/a  Appetite: good  Wound associated pain: mild  Diabetic: No  Smoker: No      Past Medical History:   Diagnosis Date    Adverse effect of anesthesia     had astmatic attack after having anesthesia for colonoscopy    Arthritis     back - has needed chronic pain manager    Asthma     severe / requires steroids    Chronic pain     back    COVID-19 2020    hospitalized X 7 days.  Covid pneumonia, aching, fever, loss of taste/smell    GERD (gastroesophageal reflux disease)     uses PPI    Hypercholesterolemia     Hypertension     Intestinal malabsorption     Lymph edema     both legs    Morbid obesity ([address]     Morbid obesity with body mass index (BMI) of 50.0 to 59.9 in adult (HCC)     Nausea & vomiting     Smoking history     quit late 80s    Status post gastric bypass for obesity     erica liana    Unspecified sleep apnea     Cpap use in the past    Venous ulcer ([address]     history of      Past Surgical History:   Procedure Laterality Date    COLONOSCOPY,DIAGNOSTIC      in conjunction with open gastric bypass    HX ABDOMINOPLASTY      HX BUNIONECTOMY Bilateral     HX  SECTION      X 2    HX GASTRIC BYPASS      HX HERNIA REPAIR      incisional- abdominal    HX KNEE REPLACEMENT Bilateral     HX ORTHOPAEDIC Right     HX ORTHOPAEDIC Bilateral     bunionectomy    HX OTHER SURGICAL Bilateral     varicose vein stipping    HX OTHER SURGICAL      colonoscopy    HX ROTATOR CUFF REPAIR Right 2014    HX TUBAL LIGATION       Family History   Problem Relation Age of Onset    Dementia Father       Social History     Tobacco Use    Smoking status: Former     Years: 0.50     Types: Cigarettes     Quit date:      Years since quittin.8    Smokeless tobacco: Never   Substance Use Topics    Alcohol use: Yes     Alcohol/week: 5.0 standard drinks     Types: 5 Glasses of wine per week     Comment: socially on weekends       Prior to Admission medications    Medication Sig Start Date End Date Taking? Authorizing Provider   linezolid (ZYVOX) 600 mg tablet Take 1 Tablet by mouth every twelve (12) hours. 10/14/22   Kathy Bettencourt MD   doxycycline (ADOXA) 50 mg tablet Take 50 mg by mouth two (2) times a day. Provider, Historical   naloxone (NARCAN) 4 mg/actuation nasal spray Use 1 spray intranasally, then discard. Repeat with new spray every 2 min as needed for opioid overdose symptoms, alternating nostrils. Patient not taking: Reported on 10/18/2022 9/9/22   Kathy Bettencourt MD   acetaminophen (TYLENOL) 500 mg tablet Take 500 mg by mouth every six (6) hours as needed for Pain. Provider, Historical   rosuvastatin (CRESTOR) 5 mg tablet Take 5 mg by mouth nightly. Indications: high cholesterol    Provider, Historical   LORazepam (ATIVAN) 0.5 mg tablet Take 0.5 mg by mouth as needed for Anxiety. Provider, Historical   metoprolol succinate (TOPROL-XL) 25 mg XL tablet Take 25 mg by mouth daily. Indications: high blood pressure    Provider, Historical   fluticasone propion-salmeteroL (ADVAIR/WIXELA) 250-50 mcg/dose diskus inhaler Take 1 Puff by inhalation every twelve (12) hours. Indications: controller medication for asthma    Provider, Historical   magnesium oxide 500 mg tab Take 1 Tablet by mouth daily. Provider, Historical   losartan (COZAAR) 100 mg tablet Take 100 mg by mouth daily.  Indications: high blood pressure    Provider, Historical spironolactone-hydrochlorothiazide (ALDACTAZIDE) 25-25 mg per tablet Take 1 Tablet by mouth daily. Indications: high blood pressure    Provider, Historical   albuterol-ipratropium (DUO-NEB) 2.5 mg-0.5 mg/3 ml nebu 3 mL by Nebulization route as needed. Provider, Historical   omeprazole (PRILOSEC) 20 mg capsule Take 20 mg by mouth daily. Indications: gastroesophageal reflux disease    Provider, Historical   b complex vitamins tablet Take 3 Tablets by mouth daily. Provider, Historical   multivitamin (ONE A DAY) tablet Take 1 Tab by mouth daily. Provider, Historical   albuterol (PROVENTIL HFA, VENTOLIN HFA, PROAIR HFA) 90 mcg/actuation inhaler Take 2 Puffs by inhalation every four (4) hours as needed for Wheezing. Provider, Historical     Allergies   Allergen Reactions    Latex Rash    Adhesive Tape-Silicones Rash     Patient reports she can tolerate paper tape    Bactrim [Sulfamethoprim Ds] Itching    Codeine Palpitations    Morphine Itching        Review of Systems  Gen: No fever, chills, malaise. Heent: No headache, sore throat. Heart: No chest pain, palpitations. Resp: No cough, wheezing and shortness of breath. GI: No nausea, vomiting, diarrhea, constipation, melena or hematochezia. : No urinary obstruction, dysuria or hematuria. Derm: see above   Musc/skeletal: no bone or joint complains. Vasc: No edema. Neuro: No unilateral weakness, numbness, tingling. No seizures. Heme: No easy bruising or bleeding. Objective:     Physical Exam:     Visit Vitals  BP (!) 150/73   Pulse 80   Temp 98.7 °F (37.1 °C)   Resp 18     General: well developed, well nourished, pleasant , NAD. Psych: cooperative. Pleasant. No anxiety or depression. Normal mood and affect. Neuro: alert and oriented to person/place/situation. Otherwise nonfocal.  Derm: Skin turgor for age, dry skin  HEENT: Normocephalic, atraumatic. EOMI. Conjunctiva clear. No scleral icterus. Neck: Normal range of motion.    Chest: Respirations nonlabored  Abdomen: Soft, nontender,  Lower extremities: color normal; temperature normal. Calves are supple, nontender. Capillary refill <3 sec      Wound Description:   Wound Length:      Wound Width :     Wound Depth :     Wound Abdomen   No Date First Assessed or Time First Assessed found. Primary Wound Type: Open incision/surgical site  Location: Abdomen   Wound Image    Wound Etiology Surgical   Dressing Status Intact   Cleansed Wound cleanser   Dressing/Treatment Negative Pressure Wound Therapy   Dressing Change Due 10/24/22   Wound Length (cm) 7.5 cm   Wound Width (cm) 3 cm   Wound Depth (cm) 2.8 cm   Wound Surface Area (cm^2) 22.5 cm^2   Change in Wound Size % 6.25   Wound Volume (cm^3) 63 cm^3   Wound Healing % 42   Wound Assessment Granulation tissue; Exposed Structure Fascia   Drainage Amount Moderate   Drainage Description Serosanguinous   Wound Odor None   Na-Wound/Incision Assessment Intact   Edges Defined edges   Wound Thickness Description Full thickness   Wound Abdomen Left Drain site   Date First Assessed/Time First Assessed: 10/20/22 1455   Present on Hospital Admission: Yes  Wound Approximate Age at First Assessment (Weeks): 1 weeks  Primary Wound Type: Catheter entry/exit site  Location: Abdomen  Wound Location Orientation: Left . ..    Wound Image    Wound Etiology Surgical   Dressing Status Breakthrough drainage noted   Cleansed Wound cleanser   Dressing/Treatment Collagen;Alginate with Ag   Dressing Change Due 10/24/22   Wound Length (cm) 0.2 cm   Wound Width (cm) 0.5 cm   Wound Depth (cm) 0.1 cm   Wound Surface Area (cm^2) 0.1 cm^2   Wound Volume (cm^3) 0.01 cm^3   Wound Assessment Slough;Granulation tissue   Drainage Amount Small   Drainage Description Serosanguinous   Wound Odor None   Na-Wound/Incision Assessment Intact   Edges Attached edges   Wound Thickness Description Full thickness        Data Review:   No results found for this or any previous visit (from the past 24 hour(s)). Assessment:     Patient Active Problem List   Diagnosis Code    Right knee DJD M17.11    Status post gastric bypass for obesity Z98.84    Intestinal malabsorption K90.9    Morbid obesity with body mass index (BMI) of 50.0 to 59.9 in adult ([facility]) E66.01, Z68.43    Hypertension I10    Arthritis M19.90    Asthma J45.909    GERD (gastroesophageal reflux disease) K21.9    Lymph edema I89.0    Hypercholesterolemia E78.00    Smoking history Z87.891    Right foot pain M79.671    Incisional hernia K43.2    Deep postoperative wound infection T81.42XA    Open wound of abdomen S31.109A     65 y.o. female with with open wound of abdomen. Needs :  Serial debridement-   Good local wound care  Plan:   Continue with wound vac  In wound care clinic today:  Cleanse wound with NS or soap and water or commercial wound cleanser  Apply the following topically to wound bed: barrington  Apply the following to whitney-wound: NA  Apply the following dressings: Absorptive dressing    For Home Care/Self Care:  Cleanse wound with NS or soap and water or commercial wound cleanser  Keep dressing dry and intact when bathing  Apply the following to wound bed: barrington  Apply the following to skin around wound: NA  Apply the following dressings: Absorptive dressing        Patient to return for wound care in:  7 Days  Follow up with Nurse visit as recommended. PLEASE CONTACT OFFICE AS SOON AS POSSIBLE IF UNABLE TO MAKE THIS APPOINTMENT. Inspect your wounds, looking for signs of infection which may include the following:  Increase in redness  Red \"streaks\" from wound  Increase in swelling  Fever  Unusual odor  Change in the amount of wound drainage. Should you experience any significant changes in your wound(s) or have any questions regarding your home care instructions please contact the wound center or your home health company. If after regular business hours, please call your family doctor or local emergency room.     Edema Control: Elevate legs as much as possible. Avoid standing in one position for more than 10 minutes. Avoid setting with legs down. Do not cross legs while sitting. Off-Loading:     Frequent position changes. Do not cross legs while sitting. Shift weight every 20 minutes or more when sitting for prolonged periods of time.   Signed By: Nadya Carter MD     October 21, 2022

## 2022-10-22 ENCOUNTER — HOME CARE VISIT (OUTPATIENT)
Dept: SCHEDULING | Facility: HOME HEALTH | Age: 62
End: 2022-10-22
Payer: COMMERCIAL

## 2022-10-22 PROCEDURE — G0299 HHS/HOSPICE OF RN EA 15 MIN: HCPCS

## 2022-10-24 ENCOUNTER — HOME CARE VISIT (OUTPATIENT)
Dept: SCHEDULING | Facility: HOME HEALTH | Age: 62
End: 2022-10-24
Payer: COMMERCIAL

## 2022-10-24 VITALS
SYSTOLIC BLOOD PRESSURE: 148 MMHG | DIASTOLIC BLOOD PRESSURE: 88 MMHG | OXYGEN SATURATION: 98 % | TEMPERATURE: 97.8 F | HEART RATE: 76 BPM | RESPIRATION RATE: 17 BRPM

## 2022-10-24 PROCEDURE — G0299 HHS/HOSPICE OF RN EA 15 MIN: HCPCS

## 2022-10-25 VITALS
RESPIRATION RATE: 16 BRPM | TEMPERATURE: 97.8 F | OXYGEN SATURATION: 97 % | DIASTOLIC BLOOD PRESSURE: 88 MMHG | SYSTOLIC BLOOD PRESSURE: 128 MMHG | HEART RATE: 72 BPM

## 2022-10-25 NOTE — HOME HEALTH
Skilled reason for visit: i&d abdominal wall abscess requiring wound care. Caregiver involvement: Patient's cg is her family. cg lives with patient and is available as needed for assistance with iadls, adls, meal prep, medication management, taking to md appointments. Medications reviewed and all medications are available in the home this visit. Patient denies any medication changes at this time of assessment. The following education was provided regarding medications, medication interactions, and look alike medications (specify): reviewed side effects, purposes, dosage, frequencies. High risk medication teaching regarding anticoagulants, hyperglycemic agents or opiod narcotics performed (specify) na  Medications  are effective at this time. Home health supplies by type and quantity ordered/delivered this visit include: pt has adequate supplies at this time. Patient education provided this visit: patient/cg instructed to monitor for edema/increase in edema, to elevate extremity when edema occurs and to notify md if edema exceeds normal limits for patient, none noted at this time. patient made aware to monitor for s/s of infection [increased swelling, increased redness around site, increased pain, foul smelling drainage, fever] aware who to report to/when. no s/s of infection noted. encouraged patient to get three nutritional meals daily and to stay hydrated, drink plenty of fluids. pt instructed to follow a high protein diet for healing- to try to get 90g protein daily. pt aware to keep dressing clean, dry and intact as ordered. Pt/cg instructed in maintenance of wound vac- change of canister, VAC trouble shooting techniques (including: reinforcing drapes), how to remove device, to call home health agency for any issues with equipment, to apply wet to dry dressing in case of VAC failure, and to notify home health agency in the event of wound vac failure or alarm.  discussed fall precautions in detail- having lighted hallways, removing throw rugs, monitoring medication that may alter mental status. Sharps education provided: na  Patient level of understanding of education provided: patient has a good understanding of education that was provided at this time by engaging in all education provided and is able to verbalize understanding, pt denies any questions or concerns at this time. Skilled Care Performed this visit: wound care performed per orders- old dressing completely removed (one piece white foam, one piece black foam), wound cleansed with dwc and applied one piece white foam to wound bed that we are unable to visualize, one piece of black foam to wound, secured with drapes and vacuum. Patient response to procedure performed:   patient tolerated procedure with no signs of discomfort, grimacing or pain, no complications or concerns noted. Agency Progress toward goals: goals/teaching reviewed. patient is progressing towards goals at this time, skilled need to continue monitoring status, wound vac. Patient's Progress towards personal goals: pt reporting they are progressing towards their goals at this time, feeling better every day. Home exercise program: patient instructed to perform sob hep 4-5 x daily and prn for sob, to promote lung expansion. pt also encouraged to use ICS q 2 hours and to perform sob hep during therapy. Continued need for the following skills: Nursing  Plan for next visit: wound care. Patient and/or caregiver notified and agrees to changes in the Plan of Care NA  The following discharge planning was discussed with the pt/caregiver: Patient will be discharged once education has completed, patient is medically stable and pt/cg are able to independently manage wound care/ wound has healed or no longer requires skilled care.

## 2022-10-25 NOTE — HOME HEALTH
Skilled reason for visit: wound vac to abdomen     Caregiver involvement:  cares for all meds . Medications reviewed and all medications are available in the home this visit. The following education was provided regarding medications:  all meds are in the home and takes them as ordered . MD notified of any discrepancies/look a-like medications/medication interactions: none  Medications are reconciled  at this time.       Home health supplies by type and quantity ordered/delivered this visit include: none    Patient education provided this visit: SN educated on s/s of infection,  wound healing and signs of wound healing     Sharps education provided: NA    Patient level of understanding of education provided: patient verbalized understanding     Skilled Care Performed this visit: wound care     Patient response to procedure performed:  patient had no complaints during wound care       Home exercise program: breathing techniques     Continued need for the following skills: Nursing    Plan for next visit: wound care     Patient and/or caregiver notified and agrees to changes in the Plan of Care YES/NO/NA: N/A      The following discharge planning was discussed with the pt/caregiver: when goals met and education is complete

## 2022-10-26 ENCOUNTER — HOME CARE VISIT (OUTPATIENT)
Dept: HOME HEALTH SERVICES | Facility: HOME HEALTH | Age: 62
End: 2022-10-26
Payer: COMMERCIAL

## 2022-10-26 ENCOUNTER — HOSPITAL ENCOUNTER (OUTPATIENT)
Dept: WOUND CARE | Age: 62
Discharge: HOME | End: 2022-10-26
Attending: HOSPITALIST
Payer: COMMERCIAL

## 2022-10-26 DIAGNOSIS — T81.42XA DEEP POSTOPERATIVE WOUND INFECTION: ICD-10-CM

## 2022-10-26 DIAGNOSIS — S31.109A OPEN WOUND OF ABDOMEN, INITIAL ENCOUNTER: Primary | ICD-10-CM

## 2022-10-26 PROCEDURE — 11042 DBRDMT SUBQ TIS 1ST 20SQCM/<: CPT

## 2022-10-26 RX ORDER — LIDOCAINE HYDROCHLORIDE 20 MG/ML
JELLY TOPICAL ONCE
Status: CANCELLED | OUTPATIENT
Start: 2022-10-26 | End: 2022-10-26

## 2022-10-26 RX ORDER — CLOBETASOL PROPIONATE 0.5 MG/G
OINTMENT TOPICAL ONCE
Status: CANCELLED | OUTPATIENT
Start: 2022-10-26 | End: 2022-10-26

## 2022-10-26 RX ORDER — LIDOCAINE 50 MG/G
OINTMENT TOPICAL ONCE
Status: CANCELLED | OUTPATIENT
Start: 2022-10-26 | End: 2022-10-26

## 2022-10-26 RX ORDER — MUPIROCIN 20 MG/G
OINTMENT TOPICAL ONCE
Status: CANCELLED | OUTPATIENT
Start: 2022-10-26 | End: 2022-10-26

## 2022-10-26 RX ORDER — LIDOCAINE 40 MG/G
CREAM TOPICAL ONCE
Status: CANCELLED | OUTPATIENT
Start: 2022-10-26 | End: 2022-10-26

## 2022-10-26 RX ORDER — LIDOCAINE HYDROCHLORIDE 40 MG/ML
SOLUTION TOPICAL ONCE
Status: CANCELLED | OUTPATIENT
Start: 2022-10-26 | End: 2022-10-26

## 2022-10-26 RX ORDER — BACITRACIN ZINC AND POLYMYXIN B SULFATE 500; 1000 [USP'U]/G; [USP'U]/G
OINTMENT TOPICAL ONCE
Status: CANCELLED | OUTPATIENT
Start: 2022-10-26 | End: 2022-10-26

## 2022-10-26 RX ORDER — LIDOCAINE HYDROCHLORIDE 20 MG/ML
JELLY TOPICAL ONCE
Status: COMPLETED | OUTPATIENT
Start: 2022-10-26 | End: 2022-10-26

## 2022-10-26 RX ADMIN — LIDOCAINE HYDROCHLORIDE: 20 JELLY TOPICAL at 14:45

## 2022-10-26 NOTE — WOUND CARE
Discharge Instructions from  [address] [facility]  [address] [place], [place], [facility]0 [facility]  [phone] Fax [phone]    NAME:  Laddie Lennox  YOB: 1960  MEDICAL RECORD NUMBER:  [id number]  DATE:  10/26/2022       Wound Cleansing:   Do not scrub or use excessive force. Cleanse wound prior to applying a clean dressing with:  [] Normal Saline        [] Keep Wound Dry in Shower    [x] Wound Cleanser   [] Cleanse wound with Mild Soap & Water  [] May Shower at Discharge   [] Other:                     Dressings:                  Wound Location: abdomen                 Apply Negative Pressure to mid abdominal wound(s)/ulcer(s). [x] Apply skin barrier prep to whitney-wound. [x] Cut strips of plastic drape to picture frame wound so that whitney-wound is     covered with the drape. [x] If bridging dressing to less prominent site, cover any intact skin that will come in contact with the Negative Pressure Therapy sponge, gauze or channel drain with plastic drape. The sponge should never touch intact skin. [x] Cut sponge, gauze or channel drain to size which will fit into the wound/ulcer bed without being forced. [x] Be sure the sponge is large enough to hold the entire round plastic flange which is attached to the tubing. Never allow flange to be larger than the sponge or it will produce suction damaging intact skin. Total number of individual pieces of foam used within the wound bed: 2  [x] If bridging the dressing away from the primary site, be sure the bridge leads to a piece of sponge large enough to hold the entire flange without allowing any of the flange to overlap onto intact skin. [x] Covered sponge, gauze or channel drain with plastic drape. [x] Cut a hole in this plastic drape directly over the sponge the same size as the plastic drain tubing. [x] Removed plastic liner from flange and apply it directly over the hole you cut.    [x] Removed the plastic cover from the flange. [x] Attached the tubing to the wound/ulcer Negative Pressure Therapy and turn it on to be sure a vacuum is created and that there are no leaks. [x] If air leaks occur, use plastic drape to patch them. [x] Secured Negative Pressure Therapy dressing with ace wrap loosely if located on an extremity. Maintain tubing outside of ace wrap. Tubing must not exert pressure on intact skin. Negative Pressure:           Wound Location:   [x] Pressure@        125   mm/Hg                    [x]Continuous  []Intermittent              [x] Black          [x] White Foam           [] Other:   [x]Change dressing:   [x]Three times per week         []Other: white sponge loosely packed into wound     Left quadrant drain site apply collagen and alginate, cover with drape, change with each vac change     Dietary:  [x] Diet as tolerated:   [] Calorie Diabetic Diet:         [] No Added Salt:  [] Increase Protein:   [] Other:           Activity:  [x] Per surgeon                                               Return Appointment:  [] Wound and dressing supply provider:   [] ECF or Home Healthcare:  [] Wound Assessment:         [] Physician or NP scheduled for Wound Assessment:   [x] Return Appointment: With Dr. Kika Camarillo in  1 De Hence) ; Following up with surgeon on 10/27/2022  [] Ordered tests:      Electronically signed Christian Feldman RN on 10/26/2022 at [address] : Should you experience any significant changes in your wound(s) or have questions about your wound care, please contact the [facility] Main at [address] [place] [address] 8:00 am - 4:30. If you need help with your wound outside these hours and cannot wait until we are again available, contact your PCP or go to the hospital emergency room. PLEASE NOTE: IF YOU ARE UNABLE TO OBTAIN WOUND SUPPLIES, CONTINUE TO USE THE SUPPLIES YOU HAVE AVAILABLE UNTIL YOU ARE ABLE TO REACH US.  IT IS MOST IMPORTANT TO KEEP THE WOUND COVERED AT ALL TIMES.      Physician Signature:_______________________    Date: ___________ Time:  ____________

## 2022-10-26 NOTE — WOUND CARE
10/26/22 1553   Wound Abdomen   No Date First Assessed or Time First Assessed found.    Primary Wound Type: Open incision/surgical site  Location: Abdomen   Wound Image     Wound Etiology Surgical   Dressing Status Intact  (wound vac)   Cleansed Wound cleanser   Dressing/Treatment Alginate;Collagen with Ag;Silicone border;Dry dressing;Tape/Soft cloth adhesive tape   Dressing Change Due 11/02/22   Wound Length (cm) 6.3 cm   Wound Width (cm) 2.7 cm   Wound Depth (cm) 2.8 cm   Wound Surface Area (cm^2) 17.01 cm^2   Change in Wound Size % 29.13   Wound Volume (cm^3) 47.628 cm^3   Wound Healing % 56   Post-Procedure Length (cm) 6.3 cm   Post-Procedure Width (cm) 2.7 cm   Post-Procedure Depth (cm) 2.9 cm   Post-Procedure Surface Area (cm^2) 17.01 cm^2   Post-Procedure Volume (cm^3) 49.329 cm^3   Wound Assessment Devitalized tissue;Granulation tissue   Drainage Amount Moderate   Drainage Description Serosanguinous   Wound Odor None   Na-Wound/Incision Assessment Intact   Edges Defined edges   Wound Thickness Description Full thickness       Pt has appointment with Surgeon on tomorrow, wound vac not applied

## 2022-10-27 NOTE — WOUND CARE
Name: Marilu Avery ADMIT: 2021   : 1955  PCP: Anatoly Jimenez MD    MRN: [id number] LOS: 7 days   AGE/SEX: 65 y.o. female  ROOM: [facility]   Subjective   Chief Complaint   Patient presents with   • Abdominal Pain      Reporting pain in her feet is still present but is improved compared to few days ago when she started taking the prednisone for gout flare.  She states she feels like the skin is stretched and it is more tender when she bears weight.  No open wound.  Not reporting any shortness of breath or chest pain.  She not having any nausea or vomiting.  She reports flatus but no bowel movement.  Abdominal pain is currently controlled.    Objective   Vital Signs  Temp:  [97.5 °F (36.4 °C)-97.8 °F (36.6 °C)] 97.6 °F (36.4 °C)  Heart Rate:  [] 105  Resp:  [16-20] 20  BP: (139-151)/(85-95) 139/94  SpO2:  [90 %-98 %] 98 %  on  Flow (L/min):  [1-2] 2;   Device (Oxygen Therapy): humidified;nasal cannula  Body mass index is 41.57 kg/m².    Physical Exam  Vitals and nursing note reviewed.   Constitutional:       General: She is not in acute distress.     Appearance: She is obese. She is not diaphoretic.   HENT:      Head: Normocephalic and atraumatic.   Eyes:      Extraocular Movements: Extraocular movements intact.      Conjunctiva/sclera: Conjunctivae normal.   Cardiovascular:      Rate and Rhythm: Normal rate. Rhythm irregular.      Pulses: Normal pulses.   Pulmonary:      Effort: Pulmonary effort is normal.      Breath sounds: Decreased breath sounds present. No wheezing.   Abdominal:      Palpations: Abdomen is soft.      Comments: Binder in place   Musculoskeletal:         General: Tenderness present.      Cervical back: Neck supple. No tenderness.      Right lower leg: Edema present.      Left lower leg: Edema present.   Skin:     General: Skin is warm and dry.      Findings: No erythema.   Neurological:      Mental Status: She is alert and oriented to person, place, and time.   Psychiatric:  [facility]  Follow up note. Chief Complaint:  Alverto Arnold is a 65 y.o.  female who presents for follow up of open wound of abdomen, she had open repair of incarcerated incisional hernia with mesh on 09/07/2022. She developed infection and wound dehiscence at incision site. She underwent I&D of abdominal wall abscess on 10/12/2022. Wound cultures grew MRSA. She completed course of linezolid. Review of systems  General: No fevers or chills. Cardiovascular: No chest pain or pressure. No palpitations. Pulmonary: No shortness of breath. Gastrointestinal: No nausea, vomiting. Derm: See above                Physical Exam:     See flow sheet  General: well developed, well nourished, pleasant. Psych: cooperative. Pleasant. Neuro: alert and oriented to person/place/situation. Otherwise nonfocal.  Derm: Skin turgor normal for age, dry skin  HEENT: Normocephalic, atraumatic. EOMI. Conjunctiva clear. No scleral icterus. Chest:  Respirations non labored  Abdomen: Soft, non tender. Lower extremities: color normal; temperature normal. Calves are supple, nontender. Capillary refill <3 sec      Wound Description:   Wound Length:      Wound Width :     Wound Depth :     Wound Abdomen   No Date First Assessed or Time First Assessed found.    Primary Wound Type: Open incision/surgical site  Location: Abdomen   Wound Image                Wound Etiology Surgical   Dressing Status Intact  (wound vac)   Cleansed Wound cleanser   Dressing/Treatment Alginate;Collagen with Ag;Silicone border;Dry dressing;Tape/Soft cloth adhesive tape   Dressing Change Due 11/02/22   Wound Length (cm) 6.3 cm   Wound Width (cm) 2.7 cm   Wound Depth (cm) 2.8 cm   Wound Surface Area (cm^2) 17.01 cm^2   Change in Wound Size % 29.13   Wound Volume (cm^3) 47.628 cm^3   Wound Healing % 56   Post-Procedure Length (cm) 6.3 cm   Post-Procedure Width (cm) 2.7 cm   Post-Procedure Depth (cm) 2.9 cm   Post-Procedure Surface Area (cm^2)         Mood and Affect: Mood normal.         Behavior: Behavior normal.         Results Review:       I reviewed the patient's new clinical results.     I reviewed imaging, agree with interpretation.     I reviewed telemetry/EKG results, PAYTON. fib, rate controlled      Results from last 7 days   Lab Units 06/20/21 0528 06/19/21 0823 06/16/21 0516 06/14/21 0512 06/13/21  1810   WBC 10*3/mm3 5.45 5.65  --  4.01 8.39   HEMOGLOBIN g/dL 11.4* 12.2 11.6* 14.1 15.8   PLATELETS 10*3/mm3 246 246  --  185 246     Results from last 7 days   Lab Units 06/20/21 0528 06/19/21 2058 06/19/21 0823 06/18/21 1819 06/17/21 0535 06/16/21 0516   SODIUM mmol/L 139  --  141  --  141 139   POTASSIUM mmol/L 4.1 4.4 3.3* 3.4* 3.3* 3.3*   CHLORIDE mmol/L 104  --  102  --  106 105   CO2 mmol/L 26.5  --  26.9  --  24.1 22.8   BUN mg/dL 14  --  14  --  17 19   CREATININE mg/dL 0.72  --  0.65  --  0.74 0.78   GLUCOSE mg/dL 93  --  100*  --  106* 119*   Estimated Creatinine Clearance: 116.5 mL/min (by C-G formula based on SCr of 0.72 mg/dL).  Results from last 7 days   Lab Units 06/20/21 0528 06/19/21 0823 06/17/21 0535 06/16/21 0516 06/13/21  1810   CALCIUM mg/dL 8.7 8.8 8.4* 8.1* 10.2   ALBUMIN g/dL  --   --   --   --  4.80   MAGNESIUM mg/dL  --   --   --  2.0  --      Results from last 7 days   Lab Units 06/14/21 0512 06/13/21  1810   INR  1.21* 1.31*   APTT seconds  --  31.9        apixaban, 5 mg, Oral, Q12H  budesonide, 0.25 mg, Nebulization, BID - RT  famotidine, 20 mg, Intravenous, Q12H  furosemide, 40 mg, Oral, Daily  ipratropium-albuterol, 3 mL, Nebulization, 4x Daily - RT  losartan, 25 mg, Oral, Q24H  metoprolol tartrate, 150 mg, Oral, Q12H  polyethylene glycol, 17 g, Oral, Daily  potassium chloride, 20 mEq, Oral, Daily  sodium chloride, 10 mL, Intravenous, Q12H       Diet Regular    Assessment/Plan      Active Hospital Problems    Diagnosis  POA   • **Incarcerated ventral hernia [K43.6]  Yes   • Gout [M10.9]  Unknown   •  17.01 cm^2   Post-Procedure Volume (cm^3) 49.329 cm^3   Wound Assessment Devitalized tissue;Granulation tissue   Drainage Amount Moderate   Drainage Description Serosanguinous   Wound Odor None   Na-Wound/Incision Assessment Intact   Edges Defined edges   Wound Thickness Description Full thickness          Data Review:   No results found for this or any previous visit (from the past 24 hour(s)). Assessment:     Patient Active Problem List   Diagnosis Code    Right knee DJD M17.11    Status post gastric bypass for obesity Z98.84    Intestinal malabsorption K90.9    Morbid obesity with body mass index (BMI) of 50.0 to 59.9 in adult ([facility]) E66.01, Z68.43    Hypertension I10    Arthritis M19.90    Asthma J45.909    GERD (gastroesophageal reflux disease) K21.9    Lymph edema I89.0    Hypercholesterolemia E78.00    Smoking history Z87.891    Right foot pain M79.671    Incisional hernia K43.2    Deep postoperative wound infection T81.42XA    Open wound of abdomen S31.109A     65 y.o. female with open wound of abdomen. Needs :  Serial debridement-   Good local wound care  Plan:   Informed consent obtained. Patient/family member explained about the need of the procedure.   Consent in chart  Debridement:   Excisional debridement of open wound of abdomen  Indication: to remove necrotic tissue/ devitalized tissue/ soft eschar/ infected tissue through subcutaneous tissue epidermis and dermis layer of wound bed  Anesthesia: Topical 2% lidocaine jelly   Instrument: Curette, Blade,    Residual Necrosis: Present and scored  Bleeding: <1ml   Hemostasis: Pressure   Patient tolerated procedure well   Procedural Pain: mild  Post - procedural pain: mild    Post debridement measurements: see above  Surface area debrided: 17 sq. cm      Continue with wound vac    In wound care clinic today:  Cleanse wound with NS or soap and water or commercial wound cleanser  Apply the following topically to wound bed: barrington  Apply the Hypopotassemia [E87.6]  Unknown   • Tobacco abuse [Z72.0]  Yes   • Morbid obesity with BMI of 40.0-44.9, adult (CMS/[facility]) [E66.01, Z68.41]  Not Applicable   • COPD (chronic obstructive pulmonary disease) (CMS/[facility]) [J44.9]  Yes   • Atrial fibrillation (CMS/[facility]) [I48.91]  Yes   • HTN (hypertension) [I10]  Yes   • LISA (obstructive sleep apnea) [G47.33]  Yes   • Stage 3b chronic kidney disease (CMS/[facility]) [N18.32]  Yes   • Chronic anticoagulation [Z79.01]  Not Applicable   • SBO (small bowel obstruction) (CMS/[facility]) [K56.609]  Yes      Resolved Hospital Problems   No resolved problems to display.       · Incarcerated ventral hernia: Status post open ventral hernia repair with mesh and incidental appendectomy 6/14.  Diet advancing.  Passing flatus but no bowel movement reported to me.  Surgery following.  · Gout: She received prednisone x1 with some improvement.  Going to continue this and then will taper over couple more days.  Will check a uric acid level although may not be elevated and she should probably be able to resume her allopurinol at discharge.  · COPD/acute hypoxic respiratory failure/LISA: Pulmonology following.  Wean oxygen as able.  No acute bronchospasm currently.  Continue breathing treatments.  · Atrial fibrillation: Metoprolol.  Eliquis.  Cardiology following.  · Hypertension: Acceptable acutely.  Continue current regimen.  Monitor.  · CKD 3: Renal function stable.  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    During the entire encounter, I was wearing recommended PPE including face mask and eye protection. Hand sanitization was performed prior to entering room and upon exit.   following to whitney-wound: NA  Apply the following dressings: Absorptive dressing     For Home Care/Self Care:  Cleanse wound with NS or soap and water or commercial wound cleanser  Keep dressing dry and intact when bathing  Apply the following to wound bed: barrington  Apply the following to skin around wound: NA  Apply the following dressings: Absorptive dressing           Patient to return for wound care in:  7 Days  Follow up with Nurse visit as recommended. PLEASE CONTACT OFFICE AS SOON AS POSSIBLE IF UNABLE TO MAKE THIS APPOINTMENT. Inspect your wounds, looking for signs of infection which may include the following:  Increase in redness  Red \"streaks\" from wound  Increase in swelling       Fever  Unusual odor          Change in the amount of wound drainage. Should you experience any significant changes in your wound(s) or have any questions regarding your home care instructions please contact the wound center or your home health company. If after regular business hours, please call your family doctor or local emergency room. Edema Control:   Elevate legs as much as possible. Avoid standing in one position for more than 10 minutes. Avoid setting with legs down. Do not cross legs while sitting. Off-Loading:     Frequent position changes. Do not cross legs while sitting. Shift weight every 20 minutes or more when sitting for prolonged periods of time.     Signed By: Sukhi Raoch MD     October 26, 2022

## 2022-10-28 ENCOUNTER — HOME CARE VISIT (OUTPATIENT)
Dept: SCHEDULING | Facility: HOME HEALTH | Age: 62
End: 2022-10-28
Payer: COMMERCIAL

## 2022-10-28 VITALS
HEART RATE: 62 BPM | OXYGEN SATURATION: 97 % | DIASTOLIC BLOOD PRESSURE: 72 MMHG | TEMPERATURE: 97.8 F | RESPIRATION RATE: 16 BRPM | SYSTOLIC BLOOD PRESSURE: 122 MMHG

## 2022-10-28 PROCEDURE — G0299 HHS/HOSPICE OF RN EA 15 MIN: HCPCS

## 2022-10-31 ENCOUNTER — HOME CARE VISIT (OUTPATIENT)
Dept: SCHEDULING | Facility: HOME HEALTH | Age: 62
End: 2022-10-31
Payer: COMMERCIAL

## 2022-10-31 PROCEDURE — G0299 HHS/HOSPICE OF RN EA 15 MIN: HCPCS

## 2022-11-01 VITALS
HEART RATE: 72 BPM | OXYGEN SATURATION: 97 % | DIASTOLIC BLOOD PRESSURE: 72 MMHG | SYSTOLIC BLOOD PRESSURE: 122 MMHG | RESPIRATION RATE: 16 BRPM | TEMPERATURE: 98.8 F

## 2022-11-01 NOTE — HOME HEALTH
Skilled reason for visit: wound vac to abdomen, patient is being discharged per request as sheis going back to work in Wednesday                    Caregiver involvement:  cares for all meds . Medications reviewed and all medications are available in the home this visit. The following education was provided regarding medications:  all meds are in the home and takes them as ordered . MD notified of any discrepancies/look a-like medications/medication interactions: none         Medications are reconciled  at this time.                      Home health supplies by type and quantity ordered/delivered this visit include: none                   Patient education provided this visit: SN educated on s/s of infection,  wound healing and signs of wound healing                    Sharps education provided: NA                   Patient level of understanding of education provided: patient verbalized understanding                    Skilled Care Performed this visit: wound care                    Patient response to procedure performed:  patient had no complaints during wound care                              Home exercise program: breathing techniques                    Continued need for the following skills: Nursing                   Plan for next visit: wound care                    Patient and/or caregiver notified and agrees to changes in the Plan of Care YES/NO/NA: N/A                     The following discharge planning was discussed with the pt/caregiver: when goals met and education is complete

## 2022-11-02 ENCOUNTER — HOSPITAL ENCOUNTER (OUTPATIENT)
Dept: WOUND CARE | Age: 62
Discharge: HOME | End: 2022-11-02
Attending: HOSPITALIST
Payer: COMMERCIAL

## 2022-11-02 VITALS
DIASTOLIC BLOOD PRESSURE: 76 MMHG | SYSTOLIC BLOOD PRESSURE: 154 MMHG | RESPIRATION RATE: 18 BRPM | TEMPERATURE: 98.7 F | HEART RATE: 76 BPM | OXYGEN SATURATION: 100 %

## 2022-11-02 DIAGNOSIS — S31.109A OPEN WOUND OF ABDOMEN, INITIAL ENCOUNTER: ICD-10-CM

## 2022-11-02 DIAGNOSIS — S31.109D OPEN WOUND OF ABDOMEN, SUBSEQUENT ENCOUNTER: Primary | ICD-10-CM

## 2022-11-02 DIAGNOSIS — T81.42XA DEEP POSTOPERATIVE WOUND INFECTION: ICD-10-CM

## 2022-11-02 PROCEDURE — 97605 NEG PRS WND THER DME<=50SQCM: CPT

## 2022-11-02 RX ORDER — CLOBETASOL PROPIONATE 0.5 MG/G
OINTMENT TOPICAL ONCE
Status: CANCELLED | OUTPATIENT
Start: 2022-11-02 | End: 2022-11-02

## 2022-11-02 RX ORDER — LIDOCAINE 50 MG/G
OINTMENT TOPICAL ONCE
Status: CANCELLED | OUTPATIENT
Start: 2022-11-02 | End: 2022-11-02

## 2022-11-02 RX ORDER — LIDOCAINE 40 MG/G
CREAM TOPICAL ONCE
Status: CANCELLED | OUTPATIENT
Start: 2022-11-02 | End: 2022-11-02

## 2022-11-02 RX ORDER — LIDOCAINE HYDROCHLORIDE 20 MG/ML
JELLY TOPICAL ONCE
Status: CANCELLED | OUTPATIENT
Start: 2022-11-02 | End: 2022-11-02

## 2022-11-02 RX ORDER — MUPIROCIN 20 MG/G
OINTMENT TOPICAL ONCE
Status: CANCELLED | OUTPATIENT
Start: 2022-11-02 | End: 2022-11-02

## 2022-11-02 RX ORDER — LIDOCAINE HYDROCHLORIDE 40 MG/ML
SOLUTION TOPICAL ONCE
Status: CANCELLED | OUTPATIENT
Start: 2022-11-02 | End: 2022-11-02

## 2022-11-02 RX ORDER — BACITRACIN ZINC AND POLYMYXIN B SULFATE 500; 1000 [USP'U]/G; [USP'U]/G
OINTMENT TOPICAL ONCE
Status: CANCELLED | OUTPATIENT
Start: 2022-11-02 | End: 2022-11-02

## 2022-11-02 NOTE — DISCHARGE INSTRUCTIONS
Discharge Instructions from  [address] LT[facility], located within [facility]  [address] [facility], [facility] [facility] [address]  [phone] Fax [phone]    Do not remove dressing     Return Appointment:  [] Wound and dressing supply provider:   [] ECF or Home Healthcare:  [] Wound Assessment: [] Physician or NP scheduled for Wound Assessment:   [x] Return Appointment: With MD  in  1 Week(s)  [] Ordered tests:       [facility] Information: Should you experience any significant changes in your wound(s) or have questions about your wound care, please contact the [facility] Main at [address] [place] [address] 8:00 am - 4:30. If you need help with your wound outside these hours and cannot wait until we are again available, contact your PCP or go to the hospital emergency room. PLEASE NOTE: IF YOU ARE UNABLE TO OBTAIN WOUND SUPPLIES, CONTINUE TO USE THE SUPPLIES YOU HAVE AVAILABLE UNTIL YOU ARE ABLE TO REACH US. IT IS MOST IMPORTANT TO KEEP THE WOUND COVERED AT ALL TIMES.

## 2022-11-02 NOTE — WOUND CARE
11/02/22 1505   Wound Abdomen   No Date First Assessed or Time First Assessed found. Primary Wound Type: Open incision/surgical site  Location: Abdomen   Wound Image    Wound Etiology Surgical   Dressing Status Other (Comment)   Cleansed Wound cleanser   Dressing/Treatment Negative Pressure Wound Therapy   Dressing Change Due 11/09/22   Wound Length (cm) 5.5 cm   Wound Width (cm) 2 cm   Wound Depth (cm) 2.5 cm   Wound Surface Area (cm^2) 11 cm^2   Change in Wound Size % 54.17   Wound Volume (cm^3) 27.5 cm^3   Wound Healing % 75   Distance Tunneling (cm) 4 cm   Direction of Tunnel 12 o'clock   Wound Assessment Granulation tissue   Drainage Amount Moderate   Drainage Description Serosanguinous   Wound Odor None   Whitney-Wound/Incision Assessment Intact   Edges Defined edges   Wound Thickness Description Full thickness     Negative Pressure    NAME:  Jeff Lawson  YOB: 1960  MEDICAL RECORD NUMBER:  [id number]  DATE:  11/2/2022    Applied Negative Pressure to abd wound(s)/ulcer(s). [x] Applied skin barrier prep to whitney-wound. [x] Cut strips of plastic drape to picture frame wound so that whitney-wound is     covered with the drape. [x] If bridging dressing to less prominent site, cover any intact skin that will come in contact with the Negative Pressure Therapy sponge, gauze or channel drain with plastic drape. The sponge should never touch intact skin. [x] Cut sponge, gauze or channel drain to size which will fit into the wound/ulcer bed without being forced. [x] Be sure the sponge is large enough to hold the entire round plastic flange which is attached to the tubing. Never allow flange to be larger than the sponge or it will produce suction damaging intact skin.   Total number of individual pieces of foam used within the wound bed: 2  [x] If bridging the dressing away from the primary site, be sure the bridge leads to a piece of sponge large enough to hold the entire flange without allowing any of the flange to overlap onto intact skin. [x] Covered sponge, gauze or channel drain with plastic drape. [x] Cut a hole in this plastic drape directly over the sponge the same size as the plastic drain tubing. [x] Removed plastic liner from flange and apply it directly over the hole you cut. [x] Removed the plastic cover from the flange. [x] Attached the tubing to the wound/ulcer Negative Pressure Therapy and turn it on to be sure a vacuum is created and that there are no leaks. [x] If air leaks occur, use plastic drape to patch them. [x] Secured Negative Pressure Therapy dressing with ace wrap loosely if located on an extremity. Maintain tubing outside of ace wrap. Tubing must not exert pressure on intact skin.     Response to treatment: Well tolerated by patient      Applied per  Guidelines      Electronically signed by Cyn Arnold RN on 11/2/2022 at 4:13 PM

## 2022-11-03 NOTE — WOUND CARE
[address] [facility]  Follow up note. Chief Complaint:  Loree Holloway is a 65 y.o.  female who presents for follow up of open wound of abdomen, she had open repair of incarcerated incisional hernia with mesh on 09/07/2022. She developed infection and wound dehiscence at incision site. She underwent I&D of abdominal wall abscess on 10/12/2022. Wound cultures grew MRSA. She completed course of linezolid. Review of systems  General: No fevers or chills. Cardiovascular: No chest pain or pressure. No palpitations. Pulmonary: No shortness of breath. Gastrointestinal: No nausea, vomiting. Derm: See above                Physical Exam:     See flow sheet  General: well developed, well nourished, pleasant. Psych: cooperative. Pleasant. Neuro: alert and oriented to person/place/situation. Otherwise nonfocal.  Derm: Skin turgor normal for age, dry skin  HEENT: Normocephalic, atraumatic. EOMI. Conjunctiva clear. No scleral icterus. Chest:  Respirations non labored  Abdomen: Soft, non tender. Lower extremities: color normal; temperature normal. Calves are supple, nontender. Capillary refill <3 sec      Wound Description:   Wound Length:      Wound Width :     Wound Depth :   Wound Abdomen   No Date First Assessed or Time First Assessed found.    Primary Wound Type: Open incision/surgical site  Location: Abdomen   Wound Image    Wound Etiology Surgical   Dressing Status Other (Comment)   Cleansed Wound cleanser   Dressing/Treatment Negative Pressure Wound Therapy   Dressing Change Due 11/09/22   Wound Length (cm) 5.5 cm   Wound Width (cm) 2 cm   Wound Depth (cm) 2.5 cm   Wound Surface Area (cm^2) 11 cm^2   Change in Wound Size % 54.17   Wound Volume (cm^3) 27.5 cm^3   Wound Healing % 75   Distance Tunneling (cm) 4 cm   Direction of Tunnel 12 o'clock   Wound Assessment Granulation tissue   Drainage Amount Moderate   Drainage Description Serosanguinous   Wound Odor None   Na-Wound/Incision Assessment Intact   Edges Defined edges   Wound Thickness Description Full thickness          Data Review:   No results found for this or any previous visit (from the past 24 hour(s)). Assessment:     Patient Active Problem List   Diagnosis Code    Right knee DJD M17.11    Status post gastric bypass for obesity Z98.84    Intestinal malabsorption K90.9    Morbid obesity with body mass index (BMI) of 50.0 to 59.9 in adult ([facility]) E66.01, Z68.43    Hypertension I10    Arthritis M19.90    Asthma J45.909    GERD (gastroesophageal reflux disease) K21.9    Lymph edema I89.0    Hypercholesterolemia E78.00    Smoking history Z87.891    Right foot pain M79.671    Incisional hernia K43.2    Deep postoperative wound infection T81.42XA    Open wound of abdomen S31.109A     65 y.o. female with open wound of abdomen. Needs :  Serial debridement-   Good local wound care  Plan:       Continue with wound vac    In wound care clinic today:  Cleanse wound with NS or soap and water or commercial wound cleanser  Apply the following topically to wound bed: barrington  Apply the following to whitney-wound: NA  Apply the following dressings: Absorptive dressing     For Home Care/Self Care:  Cleanse wound with NS or soap and water or commercial wound cleanser  Keep dressing dry and intact when bathing  Apply the following to wound bed: barrington  Apply the following to skin around wound: NA  Apply the following dressings: Absorptive dressing           Patient to return for wound care in:  7 Days  Follow up with Nurse visit as recommended. PLEASE CONTACT OFFICE AS SOON AS POSSIBLE IF UNABLE TO MAKE THIS APPOINTMENT. Inspect your wounds, looking for signs of infection which may include the following:  Increase in redness  Red \"streaks\" from wound  Increase in swelling       Fever  Unusual odor          Change in the amount of wound drainage.  Should you experience any significant changes in your wound(s) or have any questions regarding your home care instructions please contact the wound center or your home health company. If after regular business hours, please call your family doctor or local emergency room. Edema Control:   Elevate legs as much as possible. Avoid standing in one position for more than 10 minutes. Avoid setting with legs down. Do not cross legs while sitting. Off-Loading:     Frequent position changes. Do not cross legs while sitting. Shift weight every 20 minutes or more when sitting for prolonged periods of time.     Signed By: Aric Simpson MD     November 3, 2022

## 2022-11-09 ENCOUNTER — HOSPITAL ENCOUNTER (OUTPATIENT)
Dept: WOUND CARE | Age: 62
Discharge: HOME | End: 2022-11-09
Attending: HOSPITALIST
Payer: COMMERCIAL

## 2022-11-09 VITALS
TEMPERATURE: 98.6 F | HEART RATE: 81 BPM | RESPIRATION RATE: 16 BRPM | OXYGEN SATURATION: 98 % | DIASTOLIC BLOOD PRESSURE: 67 MMHG | SYSTOLIC BLOOD PRESSURE: 144 MMHG

## 2022-11-09 DIAGNOSIS — T81.42XA DEEP POSTOPERATIVE WOUND INFECTION: ICD-10-CM

## 2022-11-09 DIAGNOSIS — S31.109A OPEN WOUND OF ABDOMEN, INITIAL ENCOUNTER: Primary | ICD-10-CM

## 2022-11-09 PROCEDURE — 11043 DBRDMT MUSC&/FSCA 1ST 20/<: CPT

## 2022-11-09 PROCEDURE — 97607 NEG PRS WND THR NDME<=50SQCM: CPT

## 2022-11-09 RX ORDER — LIDOCAINE 50 MG/G
OINTMENT TOPICAL ONCE
Status: CANCELLED | OUTPATIENT
Start: 2022-11-09 | End: 2022-11-09

## 2022-11-09 RX ORDER — MUPIROCIN 20 MG/G
OINTMENT TOPICAL ONCE
Status: CANCELLED | OUTPATIENT
Start: 2022-11-09 | End: 2022-11-09

## 2022-11-09 RX ORDER — LIDOCAINE HYDROCHLORIDE 20 MG/ML
JELLY TOPICAL ONCE
Status: CANCELLED | OUTPATIENT
Start: 2022-11-09 | End: 2022-11-09

## 2022-11-09 RX ORDER — LIDOCAINE HYDROCHLORIDE 40 MG/ML
SOLUTION TOPICAL ONCE
Status: CANCELLED | OUTPATIENT
Start: 2022-11-09 | End: 2022-11-09

## 2022-11-09 RX ORDER — LIDOCAINE HYDROCHLORIDE 20 MG/ML
JELLY TOPICAL ONCE
Status: DISCONTINUED | OUTPATIENT
Start: 2022-11-09 | End: 2022-11-10 | Stop reason: HOSPADM

## 2022-11-09 RX ORDER — CLOBETASOL PROPIONATE 0.5 MG/G
OINTMENT TOPICAL ONCE
Status: CANCELLED | OUTPATIENT
Start: 2022-11-09 | End: 2022-11-09

## 2022-11-09 RX ORDER — BACITRACIN ZINC AND POLYMYXIN B SULFATE 500; 1000 [USP'U]/G; [USP'U]/G
OINTMENT TOPICAL ONCE
Status: CANCELLED | OUTPATIENT
Start: 2022-11-09 | End: 2022-11-09

## 2022-11-09 RX ORDER — LIDOCAINE 40 MG/G
CREAM TOPICAL ONCE
Status: CANCELLED | OUTPATIENT
Start: 2022-11-09 | End: 2022-11-09

## 2022-11-09 NOTE — WOUND CARE
11/09/22 1546   Wound Abdomen   No Date First Assessed or Time First Assessed found. Primary Wound Type: Open incision/surgical site  Location: Abdomen   Wound Image    Wound Etiology Surgical   Dressing Status Clean;Dry; Intact   Cleansed Wound cleanser   Dressing/Treatment Collagen with Ag;Negative Pressure Wound Therapy   Dressing Change Due 11/16/22   Wound Length (cm) 4.5 cm   Wound Width (cm) 1.7 cm   Wound Depth (cm) 1.5 cm   Wound Surface Area (cm^2) 7.65 cm^2   Change in Wound Size % 68.13   Wound Volume (cm^3) 11.475 cm^3   Wound Healing % 89   Distance Tunneling (cm) 1.7 cm   Direction of Tunnel 12 o'clock   Wound Assessment Granulation tissue; Devitalized tissue   Drainage Amount Scant   Drainage Description Serosanguinous   Wound Odor None   Whitney-Wound/Incision Assessment Intact   Edges Defined edges   Wound Thickness Description Full thickness     Negative Pressure    NAME:  Jeff Lawson  YOB: 1960  MEDICAL RECORD NUMBER:  [id number]  DATE:  11/9/2022    Applied Negative Pressure to abdominal wound  [x] Applied skin barrier prep to whitney-wound. [x] Cut strips of plastic drape to picture frame wound so that whitney-wound is     covered with the drape. [x] Cut sponge, gauze or channel drain to size which will fit into the wound/ulcer bed without being forced. Total number of individual pieces of foam used within the wound bed: 1 white 1 black  [x] Covered sponge, gauze or channel drain with plastic drape. [x] Cut a hole in this plastic drape directly over the sponge the same size as the plastic drain tubing. [x] Removed plastic liner from flange and apply it directly over the hole you cut. [x] Removed the plastic cover from the flange. [x] Attached the tubing to the wound/ulcer Negative Pressure Therapy and turn it on to be sure a vacuum is created and that there are no leaks. [x] If air leaks occur, use plastic drape to patch them.        Response to treatment: Well tolerated by patient      Applied per  Guidelines      Electronically signed by Ludmila Augustin RN on 11/9/2022 at 3:48 PM

## 2022-11-10 NOTE — WOUND CARE
[address] [facility]  Follow up note. Chief Complaint:  Anjum Rodrigues is a 65 y.o.  female who presents for follow up of open wound of abdomen, she had open repair of incarcerated incisional hernia with mesh on 09/07/2022. She developed infection and wound dehiscence at incision site. She underwent I&D of abdominal wall abscess on 10/12/2022. Wound cultures grew MRSA. She completed course of linezolid. Review of systems  General: No fevers or chills. Cardiovascular: No chest pain or pressure. No palpitations. Pulmonary: No shortness of breath. Gastrointestinal: No nausea, vomiting. Derm: See above                Physical Exam:     See flow sheet  General: well developed, well nourished, pleasant. Psych: cooperative. Pleasant. Neuro: alert and oriented to person/place/situation. Otherwise nonfocal.  Derm: Skin turgor normal for age, dry skin  HEENT: Normocephalic, atraumatic. EOMI. Conjunctiva clear. No scleral icterus. Chest:  Respirations non labored  Abdomen: Soft, non tender. Lower extremities: color normal; temperature normal. Calves are supple, nontender. Capillary refill <3 sec      Wound Description:   Wound Length:      Wound Width :     Wound Depth :   Wound Abdomen   No Date First Assessed or Time First Assessed found. Primary Wound Type: Open incision/surgical site  Location: Abdomen   Wound Image    Wound Etiology Surgical   Dressing Status Clean;Dry; Intact   Cleansed Wound cleanser   Dressing/Treatment Collagen with Ag;Negative Pressure Wound Therapy   Dressing Change Due 11/16/22   Wound Length (cm) 4.5 cm   Wound Width (cm) 1.7 cm   Wound Depth (cm) 1.5 cm   Wound Surface Area (cm^2) 7.65 cm^2   Change in Wound Size % 68.13   Wound Volume (cm^3) 11.475 cm^3   Wound Healing % 89   Distance Tunneling (cm) 1.7 cm   Direction of Tunnel 12 o'clock   Wound Assessment Granulation tissue; Devitalized tissue   Drainage Amount Scant   Drainage Description Serosanguinous   Wound Odor None   Whitney-Wound/Incision Assessment Intact   Edges Defined edges   Wound Thickness Description Full thickness          Data Review:   No results found for this or any previous visit (from the past 24 hour(s)). Assessment:     Patient Active Problem List   Diagnosis Code    Right knee DJD M17.11    Status post gastric bypass for obesity Z98.84    Intestinal malabsorption K90.9    Morbid obesity with body mass index (BMI) of 50.0 to 59.9 in adult ([facility]) E66.01, Z68.43    Hypertension I10    Arthritis M19.90    Asthma J45.909    GERD (gastroesophageal reflux disease) K21.9    Lymph edema I89.0    Hypercholesterolemia E78.00    Smoking history Z87.891    Right foot pain M79.671    Incisional hernia K43.2    Deep postoperative wound infection T81.42XA    Open wound of abdomen S31.109A     65 y.o. female with open wound of abdomen. Needs :  Serial debridement-   Good local wound care  Plan:       Continue with wound vac    Informed consent obtained. Patient/family member explained about the need of the procedure.   Consent in chart  Debridement:   Excisional debridement of open wound of abdomen  Indication: to remove necrotic tissue/ devitalized tissue/ soft eschar/ infected tissue through subcutaneous tissue epidermis and dermis layer of wound bed  Anesthesia: Topical 2% lidocaine jelly   Instrument: Curette, Blade,    Residual Necrosis: Present and scored  Bleeding: <1ml   Hemostasis: Pressure   Patient tolerated procedure well   Procedural Pain: mild  Post - procedural pain: mild     Post debridement measurements: see above  Surface area debrided: 7 sq. cm    In wound care clinic today:  Cleanse wound with NS or soap and water or commercial wound cleanser  Apply the following topically to wound bed: barrington  Apply the following to whitney-wound: NA  Apply the following dressings: Absorptive dressing     For Home Care/Self Care:  Cleanse wound with NS or soap and water or commercial wound cleanser  Keep dressing dry and intact when bathing  Apply the following to wound bed: barrington  Apply the following to skin around wound: NA  Apply the following dressings: Absorptive dressing           Patient to return for wound care in:  7 Days  Follow up with Nurse visit as recommended. PLEASE CONTACT OFFICE AS SOON AS POSSIBLE IF UNABLE TO MAKE THIS APPOINTMENT. Inspect your wounds, looking for signs of infection which may include the following:  Increase in redness  Red \"streaks\" from wound  Increase in swelling       Fever  Unusual odor          Change in the amount of wound drainage. Should you experience any significant changes in your wound(s) or have any questions regarding your home care instructions please contact the wound center or your home health company. If after regular business hours, please call your family doctor or local emergency room. Edema Control:   Elevate legs as much as possible. Avoid standing in one position for more than 10 minutes. Avoid setting with legs down. Do not cross legs while sitting. Off-Loading:     Frequent position changes. Do not cross legs while sitting. Shift weight every 20 minutes or more when sitting for prolonged periods of time.     Signed By: Desiree Lazo MD     November 10, 2022

## 2022-11-16 ENCOUNTER — HOSPITAL ENCOUNTER (OUTPATIENT)
Dept: WOUND CARE | Age: 62
Discharge: HOME | End: 2022-11-16
Attending: HOSPITALIST

## 2022-11-16 VITALS
SYSTOLIC BLOOD PRESSURE: 134 MMHG | DIASTOLIC BLOOD PRESSURE: 74 MMHG | OXYGEN SATURATION: 99 % | RESPIRATION RATE: 18 BRPM | HEART RATE: 98 BPM | TEMPERATURE: 98.4 F

## 2022-11-16 DIAGNOSIS — T81.42XA DEEP POSTOPERATIVE WOUND INFECTION: ICD-10-CM

## 2022-11-16 DIAGNOSIS — S31.109A OPEN WOUND OF ABDOMEN, INITIAL ENCOUNTER: Primary | ICD-10-CM

## 2022-11-16 PROCEDURE — 97605 NEG PRS WND THER DME<=50SQCM: CPT

## 2022-11-16 PROCEDURE — 11042 DBRDMT SUBQ TIS 1ST 20SQCM/<: CPT

## 2022-11-16 RX ORDER — LIDOCAINE HYDROCHLORIDE 20 MG/ML
JELLY TOPICAL ONCE
Status: CANCELLED | OUTPATIENT
Start: 2022-11-16 | End: 2022-11-16

## 2022-11-16 RX ORDER — LIDOCAINE 50 MG/G
OINTMENT TOPICAL ONCE
Status: CANCELLED | OUTPATIENT
Start: 2022-11-16 | End: 2022-11-16

## 2022-11-16 RX ORDER — LIDOCAINE HYDROCHLORIDE 40 MG/ML
SOLUTION TOPICAL ONCE
Status: CANCELLED | OUTPATIENT
Start: 2022-11-16 | End: 2022-11-16

## 2022-11-16 RX ORDER — LIDOCAINE HYDROCHLORIDE 20 MG/ML
JELLY TOPICAL ONCE
Status: COMPLETED | OUTPATIENT
Start: 2022-11-16 | End: 2022-11-16

## 2022-11-16 RX ORDER — CLOBETASOL PROPIONATE 0.5 MG/G
OINTMENT TOPICAL ONCE
Status: CANCELLED | OUTPATIENT
Start: 2022-11-16 | End: 2022-11-16

## 2022-11-16 RX ORDER — LIDOCAINE 40 MG/G
CREAM TOPICAL ONCE
Status: CANCELLED | OUTPATIENT
Start: 2022-11-16 | End: 2022-11-16

## 2022-11-16 RX ORDER — BACITRACIN ZINC AND POLYMYXIN B SULFATE 500; 1000 [USP'U]/G; [USP'U]/G
OINTMENT TOPICAL ONCE
Status: CANCELLED | OUTPATIENT
Start: 2022-11-16 | End: 2022-11-16

## 2022-11-16 RX ORDER — MUPIROCIN 20 MG/G
OINTMENT TOPICAL ONCE
Status: CANCELLED | OUTPATIENT
Start: 2022-11-16 | End: 2022-11-16

## 2022-11-16 RX ADMIN — LIDOCAINE HYDROCHLORIDE: 20 JELLY TOPICAL at 15:30

## 2022-11-16 NOTE — WOUND CARE
11/16/22 1626   Wound Abdomen   No Date First Assessed or Time First Assessed found. Primary Wound Type: Open incision/surgical site  Location: Abdomen   Wound Image     Wound Etiology Surgical   Dressing Status Intact   Cleansed Wound cleanser   Dressing/Treatment Collagen with Ag;Negative Pressure Wound Therapy   Dressing Change Due 11/23/22   Wound Length (cm) 3.9 cm   Wound Width (cm) 1.8 cm   Wound Depth (cm) 1 cm   Wound Surface Area (cm^2) 7.02 cm^2   Change in Wound Size % 70.75   Wound Volume (cm^3) 7.02 cm^3   Wound Healing % 94   Post-Procedure Length (cm) 4 cm   Post-Procedure Width (cm) 1.9 cm   Post-Procedure Depth (cm) 1 cm   Post-Procedure Surface Area (cm^2) 7.6 cm^2   Post-Procedure Volume (cm^3) 7.6 cm^3   Distance Tunneling (cm) 1.7 cm   Direction of Tunnel 12 o'clock   Wound Assessment Devitalized tissue;Granulation tissue   Drainage Amount Moderate   Drainage Description Serosanguinous   Wound Odor None   Whitney-Wound/Incision Assessment Intact   Edges Defined edges   Wound Thickness Description Full thickness       Negative Pressure    NAME:  Natalie Cohen  YOB: 1960  MEDICAL RECORD NUMBER:  [id number]  DATE:  11/16/2022    Applied Negative Pressure to abdominal wound(s)/ulcer(s). [x] Applied skin barrier prep to whitney-wound. [x] Cut strips of plastic drape to picture frame wound so that whitney-wound is     covered with the drape. [] If bridging dressing to less prominent site, cover any intact skin that will come in contact with the Negative Pressure Therapy sponge, gauze or channel drain with plastic drape. The sponge should never touch intact skin. [x] Cut sponge, gauze or channel drain to size which will fit into the wound/ulcer bed without being forced. [x] Be sure the sponge is large enough to hold the entire round plastic flange which is attached to the tubing.   Never allow flange to be larger than the sponge or it will produce suction damaging intact skin.  Total number of individual pieces of foam used within the wound bed: 2  [] If bridging the dressing away from the primary site, be sure the bridge leads to a piece of sponge large enough to hold the entire flange without allowing any of the flange to overlap onto intact skin. [x] Covered sponge, gauze or channel drain with plastic drape. [x] Cut a hole in this plastic drape directly over the sponge the same size as the plastic drain tubing. [x] Removed plastic liner from flange and apply it directly over the hole you cut. [x] Removed the plastic cover from the flange. [x] Attached the tubing to the wound/ulcer Negative Pressure Therapy and turn it on to be sure a vacuum is created and that there are no leaks. [x] If air leaks occur, use plastic drape to patch them. [] Secured Negative Pressure Therapy dressing with ace wrap loosely if located on an extremity. Maintain tubing outside of ace wrap. Tubing must not exert pressure on intact skin.     Response to treatment: Well tolerated by patient      Applied per  Guidelines      Electronically signed by Christopher Schulz RN on 11/16/2022 at 4:29 PM

## 2022-11-23 ENCOUNTER — HOSPITAL ENCOUNTER (OUTPATIENT)
Dept: WOUND CARE | Age: 62
Discharge: HOME | End: 2022-11-23
Attending: HOSPITALIST
Payer: COMMERCIAL

## 2022-11-23 PROCEDURE — 99213 OFFICE O/P EST LOW 20 MIN: CPT

## 2022-11-23 RX ORDER — LIDOCAINE 40 MG/G
CREAM TOPICAL ONCE
OUTPATIENT
Start: 2022-11-23 | End: 2022-11-23

## 2022-11-23 RX ORDER — MUPIROCIN 20 MG/G
OINTMENT TOPICAL ONCE
OUTPATIENT
Start: 2022-11-23 | End: 2022-11-23

## 2022-11-23 RX ORDER — CLOBETASOL PROPIONATE 0.5 MG/G
OINTMENT TOPICAL ONCE
OUTPATIENT
Start: 2022-11-23 | End: 2022-11-23

## 2022-11-23 RX ORDER — LIDOCAINE HYDROCHLORIDE 20 MG/ML
JELLY TOPICAL ONCE
OUTPATIENT
Start: 2022-11-23 | End: 2022-11-23

## 2022-11-23 RX ORDER — LIDOCAINE HYDROCHLORIDE 40 MG/ML
SOLUTION TOPICAL ONCE
OUTPATIENT
Start: 2022-11-23 | End: 2022-11-23

## 2022-11-23 RX ORDER — LIDOCAINE 50 MG/G
OINTMENT TOPICAL ONCE
OUTPATIENT
Start: 2022-11-23 | End: 2022-11-23

## 2022-11-23 RX ORDER — BACITRACIN ZINC AND POLYMYXIN B SULFATE 500; 1000 [USP'U]/G; [USP'U]/G
OINTMENT TOPICAL ONCE
OUTPATIENT
Start: 2022-11-23 | End: 2022-11-23

## 2022-11-23 NOTE — WOUND CARE
11/23/22 1613   Wound Abdomen   No Date First Assessed or Time First Assessed found.    Primary Wound Type: Open incision/surgical site  Location: Abdomen   Wound Image    Wound Etiology Surgical   Dressing Status Intact   Cleansed Irrigated with saline   Dressing/Treatment Collagen;Alginate with Ag;Dry dressing;Silicone border;Tape/Soft cloth adhesive tape   Dressing Change Due 12/07/22   Wound Length (cm) 2.4 cm   Wound Width (cm) 0.4 cm   Wound Depth (cm) 0.6 cm   Wound Surface Area (cm^2) 0.96 cm^2   Change in Wound Size % 96   Wound Volume (cm^3) 0.576 cm^3   Wound Healing % 99   Distance Tunneling (cm) 0.4 cm   Direction of Tunnel 12 o'clock   Wound Assessment Granulation tissue   Drainage Amount Scant   Drainage Description Serosanguinous   Wound Odor None   Na-Wound/Incision Assessment Intact   Edges Defined edges   Wound Thickness Description Full thickness

## 2022-11-24 NOTE — WOUND CARE
[address]  Follow up note. Chief Complaint:  Brittney Heard is a 65 y.o.  female who presents for follow up of open wound of abdomen, she had open repair of incarcerated incisional hernia with mesh on 09/07/2022. She developed infection and wound dehiscence at incision site. She underwent I&D of abdominal wall abscess on 10/12/2022. Wound cultures grew MRSA. She completed course of linezolid. Review of systems  General: No fevers or chills. Cardiovascular: No chest pain or pressure. No palpitations. Pulmonary: No shortness of breath. Gastrointestinal: No nausea, vomiting. Derm: See above                Physical Exam:     See flow sheet  General: well developed, well nourished, pleasant. Psych: cooperative. Pleasant. Neuro: alert and oriented to person/place/situation. Otherwise nonfocal.  Derm: Skin turgor normal for age, dry skin  HEENT: Normocephalic, atraumatic. EOMI. Conjunctiva clear. No scleral icterus. Chest:  Respirations non labored  Abdomen: Soft, non tender. Lower extremities: color normal; temperature normal. Calves are supple, nontender. Capillary refill <3 sec      Wound Description:   Wound Length:      Wound Width :     Wound Depth :   Wound Abdomen   No Date First Assessed or Time First Assessed found.    Primary Wound Type: Open incision/surgical site  Location: Abdomen   Wound Image    Wound Etiology Surgical   Dressing Status Intact   Cleansed Irrigated with saline   Dressing/Treatment Collagen;Alginate with Ag;Dry dressing;Silicone border;Tape/Soft cloth adhesive tape   Dressing Change Due 12/07/22   Wound Length (cm) 2.4 cm   Wound Width (cm) 0.4 cm   Wound Depth (cm) 0.6 cm   Wound Surface Area (cm^2) 0.96 cm^2   Change in Wound Size % 96   Wound Volume (cm^3) 0.576 cm^3   Wound Healing % 99   Distance Tunneling (cm) 0.4 cm   Direction of Tunnel 12 o'clock   Wound Assessment Granulation tissue   Drainage Amount Scant   Drainage Description Serosanguinous Wound Odor None   Whitney-Wound/Incision Assessment Intact   Edges Defined edges   Wound Thickness Description Full thickness       Data Review:   No results found for this or any previous visit (from the past 24 hour(s)). Assessment:     Patient Active Problem List   Diagnosis Code    Right knee DJD M17.11    Status post gastric bypass for obesity Z98.84    Intestinal malabsorption K90.9    Morbid obesity with body mass index (BMI) of 50.0 to 59.9 in adult ([facility]) E66.01, Z68.43    Hypertension I10    Arthritis M19.90    Asthma J45.909    GERD (gastroesophageal reflux disease) K21.9    Lymph edema I89.0    Hypercholesterolemia E78.00    Smoking history Z87.891    Right foot pain M79.671    Incisional hernia K43.2    Deep postoperative wound infection T81.42XA    Open wound of abdomen S31.109A     65 y.o. female with open wound of abdomen. Improving. Needs :  Serial debridement-   Good local wound care  Plan:       In wound care clinic today:  Cleanse wound with NS or soap and water or commercial wound cleanser  Apply the following topically to wound bed: barrington  Apply the following to whitney-wound: NA  Apply the following dressings: Absorptive dressing     For Home Care/Self Care:  Cleanse wound with NS or soap and water or commercial wound cleanser  Keep dressing dry and intact when bathing  Apply the following to wound bed: barrington  Apply the following to skin around wound: NA  Apply the following dressings: Absorptive dressing           Patient to return for wound care in:  7 Days  Follow up with Nurse visit as recommended. PLEASE CONTACT OFFICE AS SOON AS POSSIBLE IF UNABLE TO MAKE THIS APPOINTMENT. Inspect your wounds, looking for signs of infection which may include the following:  Increase in redness  Red \"streaks\" from wound  Increase in swelling       Fever  Unusual odor          Change in the amount of wound drainage.  Should you experience any significant changes in your wound(s) or have any questions regarding your home care instructions please contact the wound center or your home health company. If after regular business hours, please call your family doctor or local emergency room. Edema Control:   Elevate legs as much as possible. Avoid standing in one position for more than 10 minutes. Avoid setting with legs down. Do not cross legs while sitting. Off-Loading:     Frequent position changes. Do not cross legs while sitting. Shift weight every 20 minutes or more when sitting for prolonged periods of time.     Signed By: Nadya Carter MD     November 23, 2022 Rivaroxaban 10 mg oral tablet: 1 tab orally once a day for 30 days

## 2022-12-01 NOTE — DISCHARGE INSTRUCTIONS
Discharge Instructions from  [address] [facility]  [address] [place] [place], [facility]  [phone] Fax [phone]    NAME:  Koko Monreal  YOB: 1960  MEDICAL RECORD NUMBER:  [id number]  DATE:  11/23/2022    Wound Cleansing:   Do not scrub or use excessive force. Cleanse wound prior to applying a clean dressing with:  [] Normal Saline [] Keep Wound Dry in Shower    [x] Wound Cleanser   [] Cleanse wound with Mild Soap & Water  [] May Shower at Discharge   [] Other:      Topical Treatments:  Do not apply lotions, creams, or ointments to wound bed unless directed. [] Apply moisturizing lotion to skin surrounding the wound prior to dressing change.  [] Apply antifungal ointment to skin surrounding the wound prior to dressing change.  [] Apply thin film of moisture barrier ointment to skin immediately around wound. [] Other:       Dressings:           Wound Location Abdomen   [] Apply Primary Dressing:       [] MediHoney Gel [] Alginate with Silver [x] Alginate   [] Collagen [x] Collagen with Silver   [] Santyl with Moisten saline gauze     [] Hydrocolloid   [] MediHoney Alginate [] Foam with Silver   [] Foam   [] Hydrofera Blue    [] Mepilex Border    [] Moisten with Saline [] Hydrogel [] Mepitel     [] Bactroban/Mupirocin [] Polysporin  [] Other:    [] Pack wound loosely with  [] Iodoform   [] Plain Packing  [] Other   [] Cover and Secure with:     [x] Gauze [] Yuliana [] Kerlix   [] Ace Wrap [] Cover Roll Tape [] ABD     [x] Other: Silicone border and secure with tape   Avoid contact of tape with skin.   [] Change dressing: [] Daily    [] Every Other Day [] Three times per week   [] Once a week [] Do Not Change Dressing   [x] Other: two times a week     Negative Pressure:           Wound Location: Abdomen  [] Pressure@           mm/Hg  []Continuous []Intermittent   [] Black  [] White Foam [] Other:   []Change dressing: []Three times per week    [x]Other: Wound vac on hold for 2 weeks            Dietary:  [x] Diet as tolerated: [] Calorie Diabetic Diet: [] No Added Salt:  [] Increase Protein: [] Other:       Activity:  [x] Activity as tolerated:  [] Patient has no activity restrictions     [] Strict Bedrest: [] Remain off Work:     [] May return to full duty work:                                   [] Return to work with restrictions:             Return Appointment:  [] Wound and dressing supply provider:   [] ECF or Home Healthcare:  [] Wound Assessment: [] Physician or NP scheduled for Wound Assessment:   [x] Return Appointment: With Dr. Melany Lassiter  in  [facility])  [] Ordered tests:      Electronically signed Demetrius Klinefelter, RN on 11/23/2022 at [address] [place] [address]: Should you experience any significant changes in your wound(s) or have questions about your wound care, please contact the [address] at [address] [place] [address] 8:00 am - 4:30. If you need help with your wound outside these hours and cannot wait until we are again available, contact your PCP or go to the hospital emergency room. PLEASE NOTE: IF YOU ARE UNABLE TO OBTAIN WOUND SUPPLIES, CONTINUE TO USE THE SUPPLIES YOU HAVE AVAILABLE UNTIL YOU ARE ABLE TO REACH US. IT IS MOST IMPORTANT TO KEEP THE WOUND COVERED AT ALL TIMES.      Physician Signature:_______________________    Date: ___________ Time:  ____________

## 2022-12-07 ENCOUNTER — HOSPITAL ENCOUNTER (OUTPATIENT)
Dept: WOUND CARE | Age: 62
Discharge: HOME | End: 2022-12-07
Attending: HOSPITALIST

## 2022-12-07 DIAGNOSIS — S31.109A OPEN WOUND OF ABDOMEN, INITIAL ENCOUNTER: Primary | ICD-10-CM

## 2022-12-07 DIAGNOSIS — T81.42XA DEEP POSTOPERATIVE WOUND INFECTION: ICD-10-CM

## 2022-12-07 PROCEDURE — 99212 OFFICE O/P EST SF 10 MIN: CPT

## 2022-12-07 RX ORDER — LIDOCAINE HYDROCHLORIDE 40 MG/ML
SOLUTION TOPICAL ONCE
OUTPATIENT
Start: 2022-12-07 | End: 2022-12-07

## 2022-12-07 RX ORDER — LIDOCAINE 40 MG/G
CREAM TOPICAL ONCE
OUTPATIENT
Start: 2022-12-07 | End: 2022-12-07

## 2022-12-07 RX ORDER — CLOBETASOL PROPIONATE 0.5 MG/G
OINTMENT TOPICAL ONCE
OUTPATIENT
Start: 2022-12-07 | End: 2022-12-07

## 2022-12-07 RX ORDER — LIDOCAINE HYDROCHLORIDE 20 MG/ML
JELLY TOPICAL ONCE
OUTPATIENT
Start: 2022-12-07 | End: 2022-12-07

## 2022-12-07 RX ORDER — MUPIROCIN 20 MG/G
OINTMENT TOPICAL ONCE
OUTPATIENT
Start: 2022-12-07 | End: 2022-12-07

## 2022-12-07 RX ORDER — BACITRACIN ZINC AND POLYMYXIN B SULFATE 500; 1000 [USP'U]/G; [USP'U]/G
OINTMENT TOPICAL ONCE
OUTPATIENT
Start: 2022-12-07 | End: 2022-12-07

## 2022-12-07 RX ORDER — LIDOCAINE 50 MG/G
OINTMENT TOPICAL ONCE
OUTPATIENT
Start: 2022-12-07 | End: 2022-12-07

## 2022-12-07 NOTE — WOUND CARE
12/07/22 1607   Wound Abdomen   No Date First Assessed or Time First Assessed found.    Primary Wound Type: Open incision/surgical site  Location: Abdomen   Wound Image    Wound Etiology Surgical   Dressing Status Intact   Dressing Change Due   (pt is discharged)   Wound Length (cm)   (Wound is clinically closed)   Wound Assessment Epithelialization   Drainage Amount None   Wound Odor None   Na-Wound/Incision Assessment Intact

## 2022-12-08 NOTE — WOUND CARE
[address] [facility]  Follow up note. Chief Complaint:  Evangelina Sanchez is a 65 y.o.  female who presents for follow up of open wound of abdomen, she had open repair of incarcerated incisional hernia with mesh on 09/07/2022. She developed infection and wound dehiscence at incision site. She underwent I&D of abdominal wall abscess on 10/12/2022. Wound cultures grew MRSA. She completed course of linezolid. Review of systems  General: No fevers or chills. Cardiovascular: No chest pain or pressure. No palpitations. Pulmonary: No shortness of breath. Gastrointestinal: No nausea, vomiting. Derm: See above                Physical Exam:     See flow sheet  General: well developed, well nourished, pleasant. Psych: cooperative. Pleasant. Neuro: alert and oriented to person/place/situation. Otherwise nonfocal.  Derm: Skin turgor normal for age, dry skin  HEENT: Normocephalic, atraumatic. EOMI. Conjunctiva clear. No scleral icterus. Chest:  Respirations non labored  Abdomen: Soft, non tender. Lower extremities: color normal; temperature normal. Calves are supple, nontender. Capillary refill <3 sec      Wound Description:   Wound Length:      Wound Width :     Wound Depth :     Data Review:   No results found for this or any previous visit (from the past 24 hour(s)). Assessment:     Patient Active Problem List   Diagnosis Code    Right knee DJD M17.11    Status post gastric bypass for obesity Z98.84    Intestinal malabsorption K90.9    Morbid obesity with body mass index (BMI) of 50.0 to 59.9 in adult ([facility]) E66.01, Z68.43    Hypertension I10    Arthritis M19.90    Asthma J45.909    GERD (gastroesophageal reflux disease) K21.9    Lymph edema I89.0    Hypercholesterolemia E78.00    Smoking history Z87.891    Right foot pain M79.671    Incisional hernia K43.2    Deep postoperative wound infection T81.42XA    Open wound of abdomen S31.109A     65 y.o. female with open wound of abdomen.      Now healed  Plan:   Follow up as needed    Signed By: Christine Adams MD     December 8, 2022

## 2024-01-15 ENCOUNTER — HOSPITAL ENCOUNTER (OUTPATIENT)
Facility: HOSPITAL | Age: 64
Setting detail: RECURRING SERIES
Discharge: HOME | End: 2024-01-18
Payer: COMMERCIAL

## 2024-01-15 PROCEDURE — 97535 SELF CARE MNGMENT TRAINING: CPT

## 2024-01-15 PROCEDURE — 97110 THERAPEUTIC EXERCISES: CPT

## 2024-01-15 PROCEDURE — 97161 PT EVAL LOW COMPLEX 20 MIN: CPT

## 2024-01-15 NOTE — PROGRESS NOTES
PHYSICAL / OCCUPATIONAL THERAPY - DAILY TREATMENT NOTE    Patient Name: Frantz Flores    Date: 1/15/2024    : 1960  Insurance: Payor: [employer] / Plan: [employer] / Product Type: *No Product type* /      Patient  verified Yes     Visit #   Current / Total 1 16   Time   In / Out 11:00 11:40   Pain   In / Out 0/10 0/10   Subjective Functional Status/Changes: SEE POC     TREATMENT AREA =  Pain in thoracic spine [M54.6]    OBJECTIVE    15 untimed eval     Therapeutic Procedures:    Tx Min Billable or 1:1 Min (if diff from Tx Min) Procedure, Rationale, Specifics   15  34995 Therapeutic Exercise (timed):  increase ROM, strength, coordination, balance, and proprioception to improve patient's ability to progress to PLOF and address remaining functional goals. (see flow sheet as applicable)     Details if applicable:       10  78899 Self Care/Home Management (timed):  improve patient knowledge and understanding of pain reducing techniques, positioning, posture/ergonomics, home safety, activity modification, diagnosis/prognosis, and physical therapy expectations, procedures and progression  to improve patient's ability to progress to PLOF and address remaining functional goals.  (see flow sheet as applicable)     Details if applicable:            Details if applicable:            Details if applicable:            Details if applicable:     25  [facility] Totals Reminder: bill using total billable min of TIMED therapeutic procedures (example: do not include dry needle or estim unattended, both untimed codes, in totals to left)  8-22 min = 1 unit; 23-37 min = 2 units; 38-52 min = 3 units; 53-67 min = 4 units; 68-82 min = 5 units   Total Total     [x]  Patient Education billed concurrently with other procedures   [x] Review HEP    [] Progressed/Changed HEP, detail:    [] Other detail:       Objective Information/Functional Measures/Assessment    SEE POC    Patient will continue to benefit from skilled PT
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
## 2024-01-17 ENCOUNTER — APPOINTMENT (OUTPATIENT)
Facility: HOSPITAL | Age: 64
End: 2024-01-17
Payer: COMMERCIAL

## 2024-01-17 ENCOUNTER — HOSPITAL ENCOUNTER (OUTPATIENT)
Facility: HOSPITAL | Age: 64
Setting detail: RECURRING SERIES
Discharge: HOME | End: 2024-01-20
Payer: COMMERCIAL

## 2024-01-17 PROCEDURE — 97113 AQUATIC THERAPY/EXERCISES: CPT

## 2024-01-17 NOTE — PROGRESS NOTES
PHYSICAL / OCCUPATIONAL THERAPY - DAILY TREATMENT NOTE (updated )    Patient Name: Frantz Flores    Date: 2024    : 1960  Insurance: Payor: [employer] / Plan: [employer] / Product Type: *No Product type* /      Patient  verified Yes     Visit #   Current / Total 2 16   Time   In / Out 200 230   Pain   In / Out 4 0   Subjective Functional Status/Changes: Patient states she has difficulty with standing and sitting.   Changes to:  Meds, Allergies, Med Hx, Sx Hx?  If yes, update Summary List no       TREATMENT AREA =  Pain in thoracic spine [M54.6]    OBJECTIVE         Therapeutic Procedures:    Tx Min Billable or 1:1 Min (if diff from Tx Min) Procedure, Rationale, Specifics     58914 Therapeutic Exercise (timed):  increase ROM, strength, coordination, balance, and proprioception to improve patient's ability to progress to PLOF and address remaining functional goals. (see flow sheet as applicable)     Details if applicable:         63808 Neuromuscular Re-Education (timed):  improve balance, coordination, kinesthetic sense, posture, core stability and proprioception to improve patient's ability to develop conscious control of individual muscles and awareness of position of extremities in order to progress to PLOF and address remaining functional goals. (see flow sheet as applicable)     Details if applicable:       52599 Manual Therapy (timed):  decrease pain, increase ROM, increase tissue extensibility, decrease edema, correct positional vertigo, decrease trigger points, and increase postural awareness to improve patient's ability to progress to PLOF and address remaining functional goals.  The manual therapy interventions were performed at a separate and distinct time from the therapeutic activities interventions . (see flow sheet as applicable)     Details if applicable:       18699 Therapeutic Activity (timed):  use of dynamic activities replicating functional movements to

## 2024-01-24 ENCOUNTER — HOSPITAL ENCOUNTER (OUTPATIENT)
Facility: HOSPITAL | Age: 64
Setting detail: RECURRING SERIES
Discharge: HOME | End: 2024-01-27
Payer: COMMERCIAL

## 2024-01-24 ENCOUNTER — APPOINTMENT (OUTPATIENT)
Facility: HOSPITAL | Age: 64
End: 2024-01-24
Payer: COMMERCIAL

## 2024-01-24 PROCEDURE — 97113 AQUATIC THERAPY/EXERCISES: CPT

## 2024-01-24 NOTE — PROGRESS NOTES
increase ROM, strength, coordination, balance, and proprioception in order to improve patient's ability to progress to PLOF and address remaining functional goals.  (see flow sheet as applicable)     Details if applicable:       19188 Self Care/Home Management (timed):  improve patient knowledge and understanding of pain reducing techniques, positioning, posture/ergonomics, home safety, activity modification, diagnosis/prognosis, and physical therapy expectations, procedures and progression  to improve patient's ability to progress to PLOF and address remaining functional goals.  (see flow sheet as applicable)     Details if applicable:     25  00468 Aquatic Therapy (timed):  decrease pain, improve strength, flexibility, and range of motion using the buoyancy, thermal properties and consistent resistance of the water to improve patient's ability to progress to PLOF and address remaining functional goals.  (see flow sheet as applicable)       [facility] Totals Reminder: bill using total billable min of TIMED therapeutic procedures (example: do not include dry needle or estim unattended, both untimed codes, in totals to left)  8-22 min = 1 unit; 23-37 min = 2 units; 38-52 min = 3 units; 53-67 min = 4 units; 68-82 min = 5 units   Total Total     [x]  Patient Education billed concurrently with other procedures   [x] Review HEP    [] Progressed/Changed HEP, detail:    [] Other detail:       Objective Information/Functional Measures/Assessment    Patient stated 3/4 way through session that she was dx with a DVT in L LE last week in the ER.  She remarked that she was sent back to work the same day the ER visit with meds. Had patient get out of pool and requested she get clearance from MD before returning to PT. Patient was agreeable.    Prior to this, patient performed well in \pool and noted back sx decreased post ending session.    Patient will continue to benefit from skilled PT / OT services to modify and progress

## 2024-01-25 ENCOUNTER — APPOINTMENT (OUTPATIENT)
Facility: HOSPITAL | Age: 64
End: 2024-01-25
Payer: COMMERCIAL

## 2024-01-26 ENCOUNTER — APPOINTMENT (OUTPATIENT)
Facility: HOSPITAL | Age: 64
End: 2024-01-26
Payer: COMMERCIAL

## 2024-01-30 ENCOUNTER — APPOINTMENT (OUTPATIENT)
Facility: HOSPITAL | Age: 64
End: 2024-01-30
Payer: COMMERCIAL

## (undated) DEVICE — P28, DRILL, Ø1.3 X 100MM (Ø2.0), SOLID, SS: Brand: BABY GORILLA®/GORILLA® PLATING SYSTEM

## (undated) DEVICE — STERILE POLYISOPRENE POWDER-FREE SURGICAL GLOVES: Brand: PROTEXIS

## (undated) DEVICE — SUT PROL 1 30IN CT1 BLU --

## (undated) DEVICE — Device

## (undated) DEVICE — SUTURE ETHLN 0 L96IN NONABSORBABLE BLK CT L40MM 1/2 CIR L886T

## (undated) DEVICE — BANDAGE,GAUZE,BULKEE II,4.5"X4.1YD,STRL: Brand: MEDLINE

## (undated) DEVICE — NDL HYPO RW/BVL 25GX1IN --

## (undated) DEVICE — GLOVE SURG SZ 8 L12IN FNGR THK79MIL GRN LTX FREE

## (undated) DEVICE — SUTURE VCRL SZ 2-0 L18IN ABSRB VLT L26MM CT-2 1/2 CIR J726D

## (undated) DEVICE — NEEDLE HYPO 25GA L1.5IN BVL ORIENTED ECLIPSE

## (undated) DEVICE — PADDING CAST W4INXL4YD ST COT COHESIVE HND TEARABLE SPEC

## (undated) DEVICE — SHEET,DRAPE,40X58,STERILE: Brand: MEDLINE

## (undated) DEVICE — ZIMMER® STERILE DISPOSABLE TOURNIQUET CUFF WITH PROTECTIVE SLEEVE AND PLC, SINGLE PORT, SINGLE BLADDER, 34 IN. (86 CM)

## (undated) DEVICE — BANDAGE COMPR EXSANGUATION SGL LAYERED NO CLSR 9FT LEN 4IN W

## (undated) DEVICE — DRESSING,GAUZE,XEROFORM,CURAD,1"X8",ST: Brand: CURAD

## (undated) DEVICE — C-ARMOR C-ARM EQUIPMENT COVERS CLEAR STERILE UNIVERSAL FIT 12 PER CASE: Brand: C-ARMOR

## (undated) DEVICE — MINOR: Brand: MEDLINE INDUSTRIES, INC.

## (undated) DEVICE — SUTURE ETHLN SZ 2-0 L18IN NONABSORBABLE BLK L26MM FS 3/8 664G

## (undated) DEVICE — PRECISION (9.0 X 0.51 X 25.0MM)

## (undated) DEVICE — PREP SKN CHLRAPRP APL 26ML STR --

## (undated) DEVICE — DERMABOND SKIN ADH 0.7ML --

## (undated) DEVICE — SYR LR LCK 1ML GRAD NSAF 30ML --

## (undated) DEVICE — P28, K-WIRE, Ø1.2 X 100MM, SINGLE TROCAR, SMOOTH SS: Brand: BABY GORILLA/GORILLA PLATING SYSTEM

## (undated) DEVICE — YANKAUER,SMOOTH HANDLE,HIGH CAPACITY: Brand: MEDLINE INDUSTRIES, INC.

## (undated) DEVICE — SUTURE MCRYL SZ 5-0 L18IN ABSRB UD L19MM PS-2 3/8 CIR PRIM Y495G

## (undated) DEVICE — BANDAGE COMPR W4INXL10YD WHITE/BEIGE E MTRX HK LOOP CLSR

## (undated) DEVICE — K WIRE

## (undated) DEVICE — SUT MONOCRYL PLUS UD 4-0 --

## (undated) DEVICE — SYR 10ML CTRL LR LCK NSAF LF --

## (undated) DEVICE — DRAIN SURG 15FR SIL RND CHN W/ TRCR FULL FLUT DBL WRP TRAD

## (undated) DEVICE — NEEDLE HYPO 20GA L1.5IN YEL POLYPR HUB S STL REG BVL STR

## (undated) DEVICE — 3-0 COATED VICRYL PLUS UNDYED 1X27" SH --

## (undated) DEVICE — SPONGE GZ W4XL4IN COT 12 PLY TYP VII WVN C FLD DSGN

## (undated) DEVICE — BANDAGE COMPR W3XL186IN SYNTH KNIT DSGN COHESIVE ELAS ECON

## (undated) DEVICE — DRAPE XR C ARM 41X74IN LF --

## (undated) DEVICE — NEEDLE HYPO 25GA L1.5IN BLU POLYPR HUB S STL REG BVL STR

## (undated) DEVICE — PAD,ABDOMINAL,5"X9",ST,LF,25/BX: Brand: MEDLINE INDUSTRIES, INC.

## (undated) DEVICE — SYR 10ML LUER LOK 1/5ML GRAD --

## (undated) DEVICE — AMD ANTIMICROBIAL SUPER SPONGES,MEDIUM: Brand: KERLIX

## (undated) DEVICE — 3M™ STERI-STRIP™ REINFORCED ADHESIVE SKIN CLOSURES, R1546, 1/4 IN X 4 IN (6 MM X 100 MM), 10 STRIPS/ENVELOPE: Brand: 3M™ STERI-STRIP™

## (undated) DEVICE — NEEDLE HYPO 21GA L1.5IN INTRAMUSCULAR S STL LATCH BVL UP

## (undated) DEVICE — DISPOSABLE TOURNIQUET CUFF SINGLE BLADDER, SINGLE PORT AND QUICK CONNECT CONNECTOR: Brand: COLOR CUFF

## (undated) DEVICE — SINGLE PORT MANIFOLD: Brand: NEPTUNE 2

## (undated) DEVICE — SWAB CULT LIQ STUART AGR AERB MOD IN BRK SGL RAYON TIP PLAS 220099] BECTON DICKINSON MICRO]

## (undated) DEVICE — PACK PROCEDURE SURG EXTREMITY CUST

## (undated) DEVICE — SUTURE PDS II SZ 2-0 L27IN ABSRB VLT L26MM CT-2 1/2 CIR Z333H

## (undated) DEVICE — MEDI-VAC SUCTION HIGH CAPACITY: Brand: CARDINAL HEALTH

## (undated) DEVICE — BANDAGE COMPR W4INXL5YD ELAS CLP CLSR

## (undated) DEVICE — REM POLYHESIVE ADULT PATIENT RETURN ELECTRODE: Brand: VALLEYLAB

## (undated) DEVICE — SWAB CULT SGL AMIES W/O CHAR FOR THRT VAG SKIN HRT CULTSWAB

## (undated) DEVICE — SOL IRRIGATION INJ NACL 0.9% 500ML BTL

## (undated) DEVICE — MASTISOL ADHESIVE LIQ 2/3ML

## (undated) DEVICE — SUTURE VCRL SZ 3-0 L18IN ABSRB UD PS-2 L19MM 1/2 CIR J497G

## (undated) DEVICE — SUTURE VCRL + SZ 4-0 L27IN ABSRB UD PS-2 3/8 CIR REV CUT VCP426H

## (undated) DEVICE — GARMENT,MEDLINE,DVT,INT,CALF,MED, GEN2: Brand: MEDLINE

## (undated) DEVICE — PREP CHLORAPREP 10.5 ML ORG --

## (undated) DEVICE — GLOVE ORANGE PI 7 1/2   MSG9075

## (undated) DEVICE — SUTURE VCRL SZ 4-0 L27IN ABSRB UD L19MM PS-2 3/8 CIR PRIM J426H

## (undated) DEVICE — SUTURE VCRL + 3-0 L27IN ABSRB UD PS-2 L19MM 3/8 CIR PRIM VCP427H

## (undated) DEVICE — DRAPE C-ARMOUR C-ARM KIT --

## (undated) DEVICE — RESERVOIR,SUCTION,100CC,SILICONE: Brand: MEDLINE

## (undated) DEVICE — SUTURE PDS + SZ 1 L96IN ABSRB VLT L65MM TP-1 1/2 CIR PDP880G

## (undated) DEVICE — KENDALL SCD EXPRESS SLEEVES, KNEE LENGTH, MEDIUM: Brand: KENDALL SCD